# Patient Record
Sex: MALE | Race: WHITE | Employment: OTHER | URBAN - METROPOLITAN AREA
[De-identification: names, ages, dates, MRNs, and addresses within clinical notes are randomized per-mention and may not be internally consistent; named-entity substitution may affect disease eponyms.]

---

## 2023-08-31 PROBLEM — F32.A DEPRESSION: Status: ACTIVE | Noted: 2023-08-31

## 2023-08-31 PROBLEM — G47.00 INSOMNIA: Status: ACTIVE | Noted: 2023-08-31

## 2023-08-31 PROBLEM — G20.A1 PARKINSON DISEASE (MULTI): Status: ACTIVE | Noted: 2023-08-31

## 2023-08-31 RX ORDER — CARBIDOPA AND LEVODOPA 50; 200 MG/1; MG/1
1 TABLET, EXTENDED RELEASE ORAL NIGHTLY
COMMUNITY
End: 2023-11-16

## 2023-08-31 RX ORDER — CARBIDOPA AND LEVODOPA 25; 100 MG/1; MG/1
1.5 TABLET ORAL 4 TIMES DAILY
COMMUNITY
End: 2024-01-08 | Stop reason: ALTCHOICE

## 2023-08-31 RX ORDER — IBUPROFEN 100 MG/5ML
SUSPENSION, ORAL (FINAL DOSE FORM) ORAL
COMMUNITY
End: 2023-11-14

## 2023-08-31 RX ORDER — LUTEIN 6 MG
TABLET ORAL
COMMUNITY
Start: 2019-10-29

## 2023-08-31 RX ORDER — LANOLIN ALCOHOL/MO/W.PET/CERES
CREAM (GRAM) TOPICAL
COMMUNITY

## 2023-08-31 RX ORDER — AMOXICILLIN 500 MG/1
500 CAPSULE ORAL 4 TIMES DAILY
COMMUNITY
End: 2023-11-14

## 2023-08-31 RX ORDER — SELEGILINE HYDROCHLORIDE 5 MG/1
5 CAPSULE ORAL DAILY
COMMUNITY
Start: 2022-03-28

## 2023-08-31 RX ORDER — CARBIDOPA AND LEVODOPA 25; 100 MG/1; MG/1
1.5 TABLET ORAL 4 TIMES DAILY
COMMUNITY
Start: 2022-05-03 | End: 2024-01-08 | Stop reason: ALTCHOICE

## 2023-08-31 RX ORDER — PRAMIPEXOLE DIHYDROCHLORIDE 0.5 MG/1
1 TABLET ORAL 3 TIMES DAILY
COMMUNITY
Start: 2021-10-06 | End: 2023-10-20 | Stop reason: SDUPTHER

## 2023-10-09 ENCOUNTER — APPOINTMENT (OUTPATIENT)
Dept: NEUROLOGY | Facility: CLINIC | Age: 68
End: 2023-10-09
Payer: MEDICARE

## 2023-10-20 ENCOUNTER — TELEPHONE (OUTPATIENT)
Dept: NEUROLOGY | Facility: HOSPITAL | Age: 68
End: 2023-10-20
Payer: MEDICARE

## 2023-10-20 DIAGNOSIS — G20.A1 PARKINSON'S DISEASE, UNSPECIFIED WHETHER DYSKINESIA PRESENT, UNSPECIFIED WHETHER MANIFESTATIONS FLUCTUATE (MULTI): Primary | ICD-10-CM

## 2023-10-20 RX ORDER — PRAMIPEXOLE DIHYDROCHLORIDE 0.5 MG/1
0.5 TABLET ORAL 3 TIMES DAILY
Qty: 270 TABLET | Refills: 2 | OUTPATIENT
Start: 2023-10-20

## 2023-10-20 RX ORDER — PRAMIPEXOLE DIHYDROCHLORIDE 0.5 MG/1
0.5 TABLET ORAL 3 TIMES DAILY
Qty: 270 TABLET | Refills: 1 | Status: SHIPPED | OUTPATIENT
Start: 2023-10-20 | End: 2024-01-29

## 2023-10-20 NOTE — TELEPHONE ENCOUNTER
Ray Beckford Jr. called.  He is out of Pramipexole 5mg 1 TID. Send refills to [facility], [address].

## 2023-10-23 ENCOUNTER — TELEPHONE (OUTPATIENT)
Dept: NEUROLOGY | Facility: CLINIC | Age: 68
End: 2023-10-23
Payer: MEDICARE

## 2023-10-23 NOTE — TELEPHONE ENCOUNTER
Patient called requesting a refill of Pramipexole  0.5 mg sent to [facility] on [facility]. Thank you

## 2023-11-13 NOTE — PROGRESS NOTES
"Subjective     Ray Beckford Jr. is a 68 y.o. year old male who presents with Parkinson's Disease, here for follow up visit.    HPI    Since last visit May 2023:    Sinemet increased, selegeline decreased and doing better overall but wearing off is an issue as noted below. He is taking Sinemet sooner and sometimes he needs to take a 5th dose.       MOTOR SYMPTOMS      +/-                            Comments  Motor sx overall  Wearing off is worse and sx more severe. Bradykinesia is severe, stiffness and gait are also much worse  L toes are cramped   Tremor - But body can feel shaky, pointer finger may shake   Rigidity +    Bradykinesia +    Gait difficulty + Worse first thing in the am, shuffling, worse in LLE  Balance issues   Falls -    PT -    Exercise + Rock steady boxing 2x/week     NON MOTOR SYMPTOMS       +/-                               Comments  Orthostatic lightheadedness  -    Cognitive     Insomnia + Chronic x 10 years, sleeps 2hrs and then wakes  on and off.   CBD he used to use that helped but the past week stopped using and has been using the vibrating glove a friend who is an  made him (1-2hrs BID) sleeping 3-4hrs instead of 2 and sleeping 6.5hrs total which is good for him   RBD -    Depression + Sometimes depressed/down, low patience. He would like to see psychology (not psychiatry)    Anxiety + Sometimes   Sialorrhea + Not consistent enough to consider injections   Hypophonia +    Dysphagia + Some, he relates to mucous build up and \"sticks down the throat\", he states he clears his throat and is OK. May cough/choke if he eats crackers or cereal--he will be seeing [facility] (has to call)    Constipation + A little, BM daily to every other day        Social  Lives with: wife  Help with ADLs: independent              [facility] Meds  Med Dose Time   Carbidopa/levodopa 25/100mg 1.5tabs 4 times a day--sometimes 5x/day bc taking closer together Every 3-3.5hrs, last dose at 8pm "   Carbidopa/levodopa CR 50/200mg  1 tab at bedtime 11pm   Selegilene 5mg  1 tab daily in the am    Pramipexole: 0.5mg  1 tab 3 times a day (8 am - 2 pm and 8pm)   Latency     Wearing off After 3hrs and sx are severe    Side effects     Dyskinesia None    Hallucinations Shadow he thinks is there but is not    Impulse control Denies excessive spending which we discussed but states he likes a bargain and has been on [employer]    Sudden sleep Only in the am if he is not well rested he feels sleepy and naps  No falling asleep suddenly unless reading in the am    Other None            Current Outpatient Medications:   •  carbidopa-levodopa (Sinemet CR)  mg ER tablet, Take 1 tablet by mouth once daily at bedtime. Do not crush or chew., Disp: , Rfl:   •  carbidopa-levodopa (Sinemet)  mg tablet, Take 1.5 tablets by mouth 4 times a day., Disp: , Rfl:   •  carbidopa-levodopa (Sinemet)  mg tablet, Take 1.5 tablets by mouth 4 times a day., Disp: , Rfl:   •  cyanocobalamin (Vitamin B-12) 1,000 mcg tablet, B-12 TABS  Refills: 0     Active, Disp: , Rfl:   •  entacapone (Comtan) 200 mg tablet, Take 1 tablet (200 mg) by mouth 4 times a day., Disp: 120 tablet, Rfl: 3  •  ferrous sulfate (IRON ORAL), Fe Tabs TBEC  Refills: 0     Active, Disp: , Rfl:   •  fish oil concentrate (Omega-3) 120-180 mg capsule, Fish Oil 1000 MG Oral Capsule  Refills: 0      Start : 29-Oct-2019  Active, Disp: , Rfl:   •  pramipexole (Mirapex) 0.5 mg tablet, Take 1 tablet (0.5 mg) by mouth 3 times a day., Disp: 270 tablet, Rfl: 1  •  selegiline (Eldepryl) 5 mg capsule, Take 1 capsule (5 mg) by mouth early in the morning.., Disp: , Rfl:   •  vitamin E acid succinate (vitamin E succinate) 134 mg (200 unit) tablet, Vitamin E 200 UNIT Oral Tablet  Refills: 0      Start : 29-Oct-2019  Active, Disp: , Rfl:        Objective   Visit Vitals  Smoking Status Former      Physical Exam    MDS UPDRS 1st Score: Motor Examination  Is the patient  on medication for treating the symptoms of Parkinson's Disease?: Yes  Patients receiving medication for treating the symptoms of Parkinson's Disease, aliyah the patient's clinical state.: On  Is the patient on Levodopa?: Yes  Minutes since last Levodopa dose: 2hrs 40mins  Speech: 1  Facial Expression: 1  Rigidty Neck: 2  Rigidty RUE: 1  Rigidity - LUE: 2  Rigidity RLE: 2  Rigidity LLE: 2  Finger Tapping Right Hand: 1  Finger Tapping Left Hand: 1  Hand Movements- Right Hand: 0  Hand Movements- Left Hand: 1  Pronatiaon-Supination Movments - Right Hand: 1  Pronatiaon-Supination Movments Left Hand: 2  Toe Tapping Right Foot: 1  Toe Tapping - Left Foot: 1  Leg Agility - Right Le  Leg Agility - Left le  Arising from Chair: 0  Gait: 2  Freezing of Gait: 0  Postural Stability: 0  Posture: 1  Global Spontanteity of Movment ( Body Bradykinesia): 1  Postural Tremor - Right Hand: 0  Postural Tremor - Left hand: 0  Kinetic Tremor - Right hand: 1  Kinetic Tremor - Left hand: 1  Rest Tremor Amplitude - RUE: 0  Rest Tremor Amplitude - LUE: 0  Rest Tremor Amplitude - RLE: 0  Rest Tremor Amplitude - LLE: 0  Rest Tremor Amplitude - Lip/Jaw: 0  Constancy of Rest Tremor: 0  MDS UPDRS Total Score: 25  Were dyskinesias (chorea or dystonia) present during examination?: No        Assessment/Plan   Mr. Ray Beckford Jr. is a 68 y.o. M  with Parkinson's disease (idiopathic) He arrives for follow up. Has had significant improvement in symptoms since starting levodopa but recently has been having consistent wearing off q3hrs with severe ridigity, slowness and gait issues when off, he has decreased time between doses and so sometimes takes a 5th dose of Sinemet. Tolerating regimen well except some mild visual illusions, am sleepiness and maybe some spending issues--discussed in detail risks of ICD and sudden sleep with pramipexole and he is OK with lowering if he does better with the addition of entacapone which we will add for wearing off.  Consider increasing Sinemet vs trying Rytary next.  No Inbrija--dx with COPD, used to be on inhalers but no longer.     Diagnoses and all orders for this visit:  Depression, unspecified depression type  -     Referral to Psychology; Future  Parkinson's disease without dyskinesia, with fluctuating manifestations  -     Referral to Psychology; Future  -     entacapone (Comtan) 200 mg tablet; Take 1 tablet (200 mg) by mouth 4 times a day.      #Try Entacapone as a booster to your current regimen to help with wearing off.     Start Entacapone 200mg 1 tab with each dose of carbidopa-levodopa 4 times a day    Side effects include but are not limited to hallucinations, dyskinesia, nausea, dizziness/low BP, GI issues (diarrhea), If these or any other occur please let me know    #We could also try increasing Sinemet or trying Rytary    #Continue medications unchanged    Med Dose Time   Carbidopa/levodopa 25/100mg 1.5tabs 4 times a day--sometimes 5x/day bc taking closer together Every 3-3.5hrs, last dose at 8pm   Carbidopa/levodopa CR 50/200mg  1 tab at bedtime 11pm   Selegilene 5mg  1 tab daily in the am    Pramipexole: 0.5mg  1 tab 3 times a day (8 am - 2 pm and 8pm)     #If any side effects or worsening sedation with changes we can lower pramipexole    Let me know if sleepiness or sudden sleep worsens    #Speech therapy as planned for voice and swallowing    #Consult psychology for depression    Please call [phone] ([phone]) to schedule an apt.     #Follow as scheduled with Dr. Britany Sequeira, NP-C  Adult/Gerontological Nurse Practitioner   [facility], Department of Neurology  [facility] [place]  [facility]  [address] [place] [address]  Phone: [phone]  Fax: [phone]

## 2023-11-14 ENCOUNTER — APPOINTMENT (OUTPATIENT)
Dept: NEUROLOGY | Facility: HOSPITAL | Age: 68
End: 2023-11-14
Payer: MEDICARE

## 2023-11-14 ENCOUNTER — APPOINTMENT (OUTPATIENT)
Dept: NEUROLOGY | Facility: CLINIC | Age: 68
End: 2023-11-14
Payer: MEDICARE

## 2023-11-14 ENCOUNTER — OFFICE VISIT (OUTPATIENT)
Dept: NEUROLOGY | Facility: HOSPITAL | Age: 68
End: 2023-11-14
Payer: MEDICARE

## 2023-11-14 DIAGNOSIS — G20.A2 PARKINSON'S DISEASE WITHOUT DYSKINESIA, WITH FLUCTUATING MANIFESTATIONS (MULTI): ICD-10-CM

## 2023-11-14 DIAGNOSIS — F32.A DEPRESSION, UNSPECIFIED DEPRESSION TYPE: Primary | ICD-10-CM

## 2023-11-14 PROCEDURE — 1036F TOBACCO NON-USER: CPT | Performed by: NURSE PRACTITIONER

## 2023-11-14 PROCEDURE — 1160F RVW MEDS BY RX/DR IN RCRD: CPT | Performed by: NURSE PRACTITIONER

## 2023-11-14 PROCEDURE — 1159F MED LIST DOCD IN RCRD: CPT | Performed by: NURSE PRACTITIONER

## 2023-11-14 PROCEDURE — 99214 OFFICE O/P EST MOD 30 MIN: CPT | Performed by: NURSE PRACTITIONER

## 2023-11-14 RX ORDER — ENTACAPONE 200 MG/1
200 TABLET ORAL 4 TIMES DAILY
Qty: 120 TABLET | Refills: 3 | Status: SHIPPED | OUTPATIENT
Start: 2023-11-14 | End: 2024-02-07 | Stop reason: SDUPTHER

## 2023-11-14 ASSESSMENT — UNIFIED PARKINSONS DISEASE RATING SCALE (UPDRS)
FACIAL_EXPRESSION: 1
PRONATION_SUPINATION_RIGHT: 1
AMPLITUDE_LIP_JAW: 0
FINGER_TAPPING_RIGHT: 1
LEG_AGILITY_LEFT: 0
TOETAPPING_LEFT: 1
AMPLITUDE_RUE: 0
RIGIDITY_LLE: 2
RIGIDITY_LUE: 2
AMPLITUDE_LUE: 0
KINETIC_TREMOR_RIGHTHAND: 1
RIGIDITY_RUE: 1
LEVODOPA: YES
PARKINSONS_MEDS: YES
POSTURAL_TREMOR_RIGHTHAND: 0
POSTURAL_TREMOR_LEFTHAND: 0
RIGIDITY_RLE: 2
CHAIR_RISING_SCALE: 0
PRONATION_SUPINATION_LEFT: 2
FINGER_TAPPING_LEFT: 1
KINETIC_TREMOR_LEFTHAND: 1
POSTURE: 1
TOTAL_SCORE: 25
GAIT: 2
RIGIDITY_NECK: 2
SPONTANEITY_OF_MOVEMENT: 1
HANDMOVEMENTS_RIGHT: 0
TOETAPPING_RIGHT: 1
POSTURAL_STABILITY: 0
LEG_AGILITY_RIGHT: 0
SPEECH: 1
FREEZING_GAIT: 0
DYSKINESIAS_PRESENT: NO
AMPLITUDE_RLE: 0
CONSTANCY_TREMOR_ATREST: 0
CLINICAL_STATE: ON
AMPLITUDE_LLE: 0

## 2023-11-14 NOTE — PATIENT INSTRUCTIONS
#Try Entacapone as a booster to your current regimen to help with wearing off.     Start Entacapone 200mg 1 tab with each dose of carbidopa-levodopa 4 times a day    Side effects include but are not limited to hallucinations, dyskinesia, nausea, dizziness/low BP, GI issues (diarrhea), If these or any other occur please let me know and OK to stop if needed.     #We could also try increasing Sinemet or trying Rytary    #Continue medications unchanged    Med Dose Time   Carbidopa/levodopa 25/100mg 1.5tabs 4 times a day--sometimes 5x/day bc taking closer together Every 3-3.5hrs, last dose at 8pm   Carbidopa/levodopa CR 50/200mg  1 tab at bedtime 11pm   Selegilene 5mg  1 tab daily in the am    Pramipexole: 0.5mg  1 tab 3 times a day (8 am - 2 pm and 8pm)     #If any side effects or worsening sedation with changes we can lower pramipexole    Let me know if sleepiness or sudden sleep worsens    #Speech therapy as planned for voice and swallowing    #Consult psychology for depression    Please call [phone] ([phone]) to schedule an apt.       #Follow as scheduled with Dr. Garg

## 2023-11-16 DIAGNOSIS — G20.A1 PARKINSON'S DISEASE (MULTI): ICD-10-CM

## 2023-11-16 RX ORDER — CARBIDOPA AND LEVODOPA 50; 200 MG/1; MG/1
1 TABLET, EXTENDED RELEASE ORAL NIGHTLY
Qty: 90 TABLET | Refills: 2 | Status: SHIPPED | OUTPATIENT
Start: 2023-11-16 | End: 2024-02-07 | Stop reason: SDUPTHER

## 2024-01-06 DIAGNOSIS — G20.A1 PARKINSON'S DISEASE (MULTI): ICD-10-CM

## 2024-01-08 RX ORDER — CARBIDOPA AND LEVODOPA 25; 100 MG/1; MG/1
TABLET ORAL
Qty: 540 TABLET | Refills: 2 | Status: SHIPPED | OUTPATIENT
Start: 2024-01-08 | End: 2024-04-02 | Stop reason: SDUPTHER

## 2024-01-08 RX ORDER — CARBIDOPA AND LEVODOPA 50; 200 MG/1; MG/1
1 TABLET, EXTENDED RELEASE ORAL NIGHTLY
Qty: 90 TABLET | Refills: 2 | OUTPATIENT
Start: 2024-01-08

## 2024-01-27 DIAGNOSIS — G20.A1 PARKINSON'S DISEASE, UNSPECIFIED WHETHER DYSKINESIA PRESENT, UNSPECIFIED WHETHER MANIFESTATIONS FLUCTUATE (MULTI): ICD-10-CM

## 2024-01-29 RX ORDER — PRAMIPEXOLE DIHYDROCHLORIDE 0.5 MG/1
0.5 TABLET ORAL 3 TIMES DAILY
Qty: 270 TABLET | Refills: 1 | Status: SHIPPED | OUTPATIENT
Start: 2024-01-29 | End: 2024-02-07 | Stop reason: SDUPTHER

## 2024-02-07 ENCOUNTER — OFFICE VISIT (OUTPATIENT)
Dept: NEUROLOGY | Facility: CLINIC | Age: 69
End: 2024-02-07
Payer: MEDICARE

## 2024-02-07 VITALS
WEIGHT: 213 LBS | DIASTOLIC BLOOD PRESSURE: 83 MMHG | HEIGHT: 71 IN | SYSTOLIC BLOOD PRESSURE: 135 MMHG | BODY MASS INDEX: 29.82 KG/M2

## 2024-02-07 DIAGNOSIS — G20.A1 PARKINSON'S DISEASE, UNSPECIFIED WHETHER DYSKINESIA PRESENT, UNSPECIFIED WHETHER MANIFESTATIONS FLUCTUATE (MULTI): ICD-10-CM

## 2024-02-07 DIAGNOSIS — G20.A1 PARKINSON'S DISEASE (MULTI): ICD-10-CM

## 2024-02-07 DIAGNOSIS — G20.A2 PARKINSON'S DISEASE WITHOUT DYSKINESIA, WITH FLUCTUATING MANIFESTATIONS (MULTI): ICD-10-CM

## 2024-02-07 PROCEDURE — 99214 OFFICE O/P EST MOD 30 MIN: CPT | Performed by: PSYCHIATRY & NEUROLOGY

## 2024-02-07 PROCEDURE — 1159F MED LIST DOCD IN RCRD: CPT | Performed by: PSYCHIATRY & NEUROLOGY

## 2024-02-07 PROCEDURE — 1125F AMNT PAIN NOTED PAIN PRSNT: CPT | Performed by: PSYCHIATRY & NEUROLOGY

## 2024-02-07 PROCEDURE — 1036F TOBACCO NON-USER: CPT | Performed by: PSYCHIATRY & NEUROLOGY

## 2024-02-07 PROCEDURE — 1160F RVW MEDS BY RX/DR IN RCRD: CPT | Performed by: PSYCHIATRY & NEUROLOGY

## 2024-02-07 RX ORDER — CARBIDOPA AND LEVODOPA 50; 200 MG/1; MG/1
1 TABLET, EXTENDED RELEASE ORAL 2 TIMES DAILY
Qty: 180 TABLET | Refills: 3 | Status: SHIPPED | OUTPATIENT
Start: 2024-02-07 | End: 2025-02-06

## 2024-02-07 RX ORDER — OMEPRAZOLE 40 MG/1
1 CAPSULE, DELAYED RELEASE ORAL
COMMUNITY
Start: 2015-02-16

## 2024-02-07 RX ORDER — PRAMIPEXOLE DIHYDROCHLORIDE 0.5 MG/1
0.5 TABLET ORAL 3 TIMES DAILY
Qty: 270 TABLET | Refills: 3 | Status: SHIPPED | OUTPATIENT
Start: 2024-02-07 | End: 2025-02-06

## 2024-02-07 RX ORDER — ENTACAPONE 200 MG/1
200 TABLET ORAL 4 TIMES DAILY
Qty: 360 TABLET | Refills: 3 | Status: SHIPPED | OUTPATIENT
Start: 2024-02-07 | End: 2025-02-06

## 2024-02-07 ASSESSMENT — ANXIETY QUESTIONNAIRES
7. FEELING AFRAID AS IF SOMETHING AWFUL MIGHT HAPPEN: NOT AT ALL
GAD7 TOTAL SCORE: 0
3. WORRYING TOO MUCH ABOUT DIFFERENT THINGS: NOT AT ALL
1. FEELING NERVOUS, ANXIOUS, OR ON EDGE: NOT AT ALL
5. BEING SO RESTLESS THAT IT IS HARD TO SIT STILL: NOT AT ALL
2. NOT BEING ABLE TO STOP OR CONTROL WORRYING: NOT AT ALL
IF YOU CHECKED OFF ANY PROBLEMS ON THIS QUESTIONNAIRE, HOW DIFFICULT HAVE THESE PROBLEMS MADE IT FOR YOU TO DO YOUR WORK, TAKE CARE OF THINGS AT HOME, OR GET ALONG WITH OTHER PEOPLE: NOT DIFFICULT AT ALL
6. BECOMING EASILY ANNOYED OR IRRITABLE: NOT AT ALL
4. TROUBLE RELAXING: NOT AT ALL

## 2024-02-07 ASSESSMENT — UNIFIED PARKINSONS DISEASE RATING SCALE (UPDRS)
FINGER_TAPPING_RIGHT: 2
LEVODOPA: YES
POSTURE: 0
RIGIDITY_RLE: 2
TOETAPPING_LEFT: 0
RIGIDITY_LLE: 1
KINETIC_TREMOR_LEFTHAND: 0
FINGER_TAPPING_LEFT: 1
CHAIR_RISING_SCALE: 0
SPEECH: 2
AMPLITUDE_LUE: 0
PRONATION_SUPINATION_LEFT: 0
RIGIDITY_RUE: 2
RIGIDITY_NECK: 1
KINETIC_TREMOR_RIGHTHAND: 0
SPONTANEITY_OF_MOVEMENT: 1
POSTURAL_TREMOR_LEFTHAND: 0
POSTURAL_STABILITY: 0
PARKINSONS_MEDS: YES
TOTAL_SCORE: 15
TOETAPPING_RIGHT: 0
AMPLITUDE_RLE: 0
AMPLITUDE_RUE: 0
FACIAL_EXPRESSION: 1
RIGIDITY_LUE: 1
HANDMOVEMENTS_RIGHT: 0
CONSTANCY_TREMOR_ATREST: 0
GAIT: 1
CLINICAL_STATE: ON
FREEZING_GAIT: 0
DYSKINESIAS_PRESENT: NO
LEG_AGILITY_RIGHT: 0
LEG_AGILITY_LEFT: 0
PRONATION_SUPINATION_RIGHT: 0
AMPLITUDE_LLE: 0
AMPLITUDE_LIP_JAW: 0
POSTURAL_TREMOR_RIGHTHAND: 0

## 2024-02-07 ASSESSMENT — PATIENT HEALTH QUESTIONNAIRE - PHQ9
1. LITTLE INTEREST OR PLEASURE IN DOING THINGS: NOT AT ALL
SUM OF ALL RESPONSES TO PHQ9 QUESTIONS 1 AND 2: 0
2. FEELING DOWN, DEPRESSED OR HOPELESS: NOT AT ALL

## 2024-02-07 ASSESSMENT — ENCOUNTER SYMPTOMS
LOSS OF SENSATION IN FEET: 0
OCCASIONAL FEELINGS OF UNSTEADINESS: 0
DEPRESSION: 0

## 2024-02-07 NOTE — PROGRESS NOTES
Subjective     Ray Beckford Jr. is a right handed  68 y.o. year old male who presents with No chief complaint on file.. Patient is accompanied by: spouse  Visit type: follow up visit       He feel she is doing well.   He hurt his foot doing [employer] boxing (plantar fasciitis)  He has developed a rather complex medication regimen, see below, but feels it works really well. Appears somewhat obsessive about his Parkinson's disease.         Med Dose Time   Carbidopa/levodopa 25/100mg 2 tabs at 7 am - 1 tab at 10 am - 2 tabs at 2 pm = 2 tabs at 6 pm, 1 tab at 9 pm, 1/2 tab at 11 pm.     Carbidopa/levodopa CR 50/200mg  1 tab at 10 am and 1 tab at bedtime 11pm   Selegilene 5mg  1 tab daily in the am     Pramipexole: 0.5mg  1 tab 3 times a day (7 am - 2 pm and 6pm)   Entacapone 200 mhg  4 times a day - 7 -10-1 and 6 pm      Meds are currently lasting from one dose to the next.   No dyskinesias.   Kick in: 30 mins -1 hour.     Review of Motor symptoms:  Tremor:  Location- denies, feels a little shake in hands when meds wear off                 Rest/postural/action- denies  Stiffness/rigidity: yes, stiff in thighs, harder to bed down.   Slowness:  mild  Trouble walking:  good apart from has plantar fascitis currently, wife says shuffling in evening  Freezing of gait:  denies  Balance problems:  pretty good  Falls:  denies  Changes in speech:  usually good, slurs when meds wear off  Swallowing difficulties:  denies, does have a lot of phlegm,   Activities of daily living (buttoning clothes, bathing, cutting food, etc):  independent    Review of Non-Motor symptoms:  Cognition:  Memory- memory is fine         Hallucinations-  denies           Mood:        Depression-  good                       Anxiety: good                       Obsessive/compulsive behaviors- some obsession w PD. Bought a lot of TVs and fixes them.   Sleep disturbances including REM behavior disorder cannot stay asleep, wakes up after 2-3 hours. He takes CBD  w melatonin - has been melatonin.   Sensory changes (ie, smell or taste):  Hyposmia  Gastrointestinal complaints/Constipation:  not really  Urinary retention or frequency:  sometimes low flow.   Positional lightheadedness and/or syncope:  denies  Excessive saliva/drooling:  denies. But has phlegm.   Fatigue:  denies      Patient Active Problem List   Diagnosis    Depression    Insomnia    Parkinson disease      Past Medical History:   Diagnosis Date    Personal history of other diseases of the digestive system     History of hiatal hernia    Personal history of other diseases of the digestive system     History of gastroesophageal reflux (GERD)      Past Surgical History:   Procedure Laterality Date    OTHER SURGICAL HISTORY  10/29/2019    Ankle surgery      Social History     Socioeconomic History    Marital status:      Spouse name: Not on file    Number of children: Not on file    Years of education: Not on file    Highest education level: Not on file   Occupational History    Not on file   Tobacco Use    Smoking status: Former     Types: Cigarettes    Smokeless tobacco: Never   Substance and Sexual Activity    Alcohol use: Never    Drug use: Never    Sexual activity: Not on file   Other Topics Concern    Not on file   Social History Narrative    Not on file     Social Determinants of Health     Financial Resource Strain: Not on file   Food Insecurity: Not on file   Transportation Needs: Not on file   Physical Activity: Not on file   Stress: Not on file   Social Connections: Not on file   Intimate Partner Violence: Not on file   Housing Stability: Not on file      No family history on file.   Patient Health Questionnaire-2 Score: 0          Review of Systems  All other system have been reviewed and are negative for complaint.  Objective   Vitals:    02/07/24 1119   BP: 135/83        Neurological Exam      MDS UPDRS 1st Score: Motor Examination  Is the patient on medication for treating the symptoms of  Parkinson's Disease?: Yes  Patients receiving medication for treating the symptoms of Parkinson's Disease, aliyah the patient's clinical state.: On  Is the patient on Levodopa?: Yes  Minutes since last Levodopa dose: 100  mins  Speech: 2  Facial Expression: 1  Rigidty Neck: 1  Rigidty RUE: 2  Rigidity - LUE: 1  Rigidity RLE: 2  Rigidity LLE: 1  Finger Tapping Right Hand: 2  Finger Tapping Left Hand: 1  Hand Movements- Right Hand: 0  Hand Movements- Left Hand: 0  Pronatiaon-Supination Movments - Right Hand: 0  Pronatiaon-Supination Movments Left Hand: 0  Toe Tapping Right Foot: 0  Toe Tapping - Left Foot: 0  Leg Agility - Right Le  Leg Agility - Left le  Arising from Chair: 0  Gait: 1 (antalgic)  Freezing of Gait: 0  Postural Stability: 0  Posture: 0  Global Spontanteity of Movment ( Body Bradykinesia): 1  Postural Tremor - Right Hand: 0  Postural Tremor - Left hand: 0  Kinetic Tremor - Right hand: 0  Kinetic Tremor - Left hand: 0  Rest Tremor Amplitude - RUE: 0  Rest Tremor Amplitude - LUE: 0  Rest Tremor Amplitude - RLE: 0  Rest Tremor Amplitude - LLE: 0  Rest Tremor Amplitude - Lip/Jaw: 0  Constancy of Rest Tremor: 0  MDS UPDRS Total Score: 15  Were dyskinesias (chorea or dystonia) present during examination?: No                           Assessment/Plan       Ray Beckford Jr. is a 68 y.o. year old male here for Mr. Ray Beckford Jr. is a 68 y.o. M  with Parkinson's disease (idiopathic) He arrives for follow up. Has had significant improvement in symptoms since starting levodopa and really likes the current medication schedule, although quite complex. Would change to Rytary next. No Inbrija--dx with COPD, used to be on inhalers but no longer.       Plan:  Continue current medications as listed above  Advised re ICD again, does have a somewhat hyperfocused view of PD, but has always been his personality, has been buying and fixing TVs,  but no overspending  Continue to exercise  Consider Rytary next  RTC in   months,

## 2024-02-07 NOTE — PATIENT INSTRUCTIONS
Lets continue to the medication as is.   Consider Rytary.   Don't spend the whole day thinking about PD.   Continue to exercise, this is great for you!  RTC in 6 months.

## 2024-02-07 NOTE — LETTER
February 8, 2024     Alfonso Choe MD  [address] [facility] [address]  As Of 11/14/2020  [facility] [address]    Patient: Ray Beckford Jr.   YOB: 1955   Date of Visit: 2/7/2024       Dear Dr. Alfonso Choe MD:    Thank you for referring Ray Beckford to me for evaluation. Below are my notes for this consultation.  If you have questions, please do not hesitate to call me. I look forward to following your patient along with you.       Sincerely,     Marjorie Garg MD      CC: No Recipients  ______________________________________________________________________________________    Subjective    Ray Beckford Jr. is a right handed  68 y.o. year old male who presents with No chief complaint on file.. Patient is accompanied by: spouse  Visit type: follow up visit       He feel she is doing well.   He hurt his foot doing [employer] boxing (plantar fasciitis)  He has developed a rather complex medication regimen, see below, but feels it works really well. Appears somewhat obsessive about his Parkinson's disease.         Med Dose Time   Carbidopa/levodopa 25/100mg 2 tabs at 7 am - 1 tab at 10 am - 2 tabs at 2 pm = 2 tabs at 6 pm, 1 tab at 9 pm, 1/2 tab at 11 pm.     Carbidopa/levodopa CR 50/200mg  1 tab at 10 am and 1 tab at bedtime 11pm   Selegilene 5mg  1 tab daily in the am     Pramipexole: 0.5mg  1 tab 3 times a day (7 am - 2 pm and 6pm)   Entacapone 200 mhg  4 times a day - 7 -10-1 and 6 pm      Meds are currently lasting from one dose to the next.   No dyskinesias.   Kick in: 30 mins -1 hour.     Review of Motor symptoms:  Tremor:  Location- denies, feels a little shake in hands when meds wear off                 Rest/postural/action- denies  Stiffness/rigidity: yes, stiff in thighs, harder to bed down.   Slowness:  mild  Trouble walking:  good apart from has plantar fascitis currently, wife says shuffling in evening  Freezing of gait:  denies  Balance problems:  pretty good  Falls:  denies  Changes in  speech:  usually good, slurs when meds wear off  Swallowing difficulties:  denies, does have a lot of phlegm,   Activities of daily living (buttoning clothes, bathing, cutting food, etc):  independent    Review of Non-Motor symptoms:  Cognition:  Memory- memory is fine         Hallucinations-  denies           Mood:        Depression-  good                       Anxiety: good                       Obsessive/compulsive behaviors- some obsession w PD. Bought a lot of TVs and fixes them.   Sleep disturbances including REM behavior disorder cannot stay asleep, wakes up after 2-3 hours. He takes CBD w melatonin - has been melatonin.   Sensory changes (ie, smell or taste):  Hyposmia  Gastrointestinal complaints/Constipation:  not really  Urinary retention or frequency:  sometimes low flow.   Positional lightheadedness and/or syncope:  denies  Excessive saliva/drooling:  denies. But has phlegm.   Fatigue:  denies      Patient Active Problem List   Diagnosis   • Depression   • Insomnia   • Parkinson disease      Past Medical History:   Diagnosis Date   • Personal history of other diseases of the digestive system     History of hiatal hernia   • Personal history of other diseases of the digestive system     History of gastroesophageal reflux (GERD)      Past Surgical History:   Procedure Laterality Date   • OTHER SURGICAL HISTORY  10/29/2019    Ankle surgery      Social History     Socioeconomic History   • Marital status:      Spouse name: Not on file   • Number of children: Not on file   • Years of education: Not on file   • Highest education level: Not on file   Occupational History   • Not on file   Tobacco Use   • Smoking status: Former     Types: Cigarettes   • Smokeless tobacco: Never   Substance and Sexual Activity   • Alcohol use: Never   • Drug use: Never   • Sexual activity: Not on file   Other Topics Concern   • Not on file   Social History Narrative   • Not on file     Social Determinants of Health      Financial Resource Strain: Not on file   Food Insecurity: Not on file   Transportation Needs: Not on file   Physical Activity: Not on file   Stress: Not on file   Social Connections: Not on file   Intimate Partner Violence: Not on file   Housing Stability: Not on file      No family history on file.   Patient Health Questionnaire-2 Score: 0          Review of Systems  All other system have been reviewed and are negative for complaint.  Objective  Vitals:    24 1119   BP: 135/83        Neurological Exam      MDS UPDRS 1st Score: Motor Examination  Is the patient on medication for treating the symptoms of Parkinson's Disease?: Yes  Patients receiving medication for treating the symptoms of Parkinson's Disease, aliyah the patient's clinical state.: On  Is the patient on Levodopa?: Yes  Minutes since last Levodopa dose: 100  mins  Speech: 2  Facial Expression: 1  Rigidty Neck: 1  Rigidty RUE: 2  Rigidity - LUE: 1  Rigidity RLE: 2  Rigidity LLE: 1  Finger Tapping Right Hand: 2  Finger Tapping Left Hand: 1  Hand Movements- Right Hand: 0  Hand Movements- Left Hand: 0  Pronatiaon-Supination Movments - Right Hand: 0  Pronatiaon-Supination Movments Left Hand: 0  Toe Tapping Right Foot: 0  Toe Tapping - Left Foot: 0  Leg Agility - Right Le  Leg Agility - Left le  Arising from Chair: 0  Gait: 1 (antalgic)  Freezing of Gait: 0  Postural Stability: 0  Posture: 0  Global Spontanteity of Movment ( Body Bradykinesia): 1  Postural Tremor - Right Hand: 0  Postural Tremor - Left hand: 0  Kinetic Tremor - Right hand: 0  Kinetic Tremor - Left hand: 0  Rest Tremor Amplitude - RUE: 0  Rest Tremor Amplitude - LUE: 0  Rest Tremor Amplitude - RLE: 0  Rest Tremor Amplitude - LLE: 0  Rest Tremor Amplitude - Lip/Jaw: 0  Constancy of Rest Tremor: 0  MDS UPDRS Total Score: 15  Were dyskinesias (chorea or dystonia) present during examination?: No                           Assessment/Plan      Ray Beckford Jr. is a 68 y.o. year old  male here for Mr. Ray Beckford . is a 68 y.o. M  with Parkinson's disease (idiopathic) He arrives for follow up. Has had significant improvement in symptoms since starting levodopa and really likes the current medication schedule, although quite complex. Would change to Rytary next. No Inbrija--dx with COPD, used to be on inhalers but no longer.       Plan:  Continue current medications as listed above  Advised re ICD again, does have a somewhat hyperfocused view of PD, but has always been his personality, has been buying and fixing TVs,  but no overspending  Continue to exercise  Consider Rytary next  RTC in 6 months,

## 2024-03-20 ENCOUNTER — TELEPHONE (OUTPATIENT)
Dept: NEUROLOGY | Facility: HOSPITAL | Age: 69
End: 2024-03-20
Payer: MEDICARE

## 2024-03-20 NOTE — TELEPHONE ENCOUNTER
Ray Beckford Jr. called.  He is running out of Carbidopa/ Levodopa 25/100. He is taking 2 QID. This is not the dose that Dr Garg suggested at the last visit. He has self adjusted his dose and therefore running out of pills early.

## 2024-03-21 ENCOUNTER — TELEPHONE (OUTPATIENT)
Dept: NEUROLOGY | Facility: CLINIC | Age: 69
End: 2024-03-21
Payer: MEDICARE

## 2024-03-21 NOTE — TELEPHONE ENCOUNTER
Called Back- No Answer, Left detailed message outlining the 4 medications on file for him. Confirmed 3 Rx were at [facility] and one at [facility]. All were refilled in Jan & Feb with 9 months of refills. Urged him to contact pharmacies for meds.

## 2024-04-02 ENCOUNTER — TELEPHONE (OUTPATIENT)
Dept: NEUROLOGY | Facility: HOSPITAL | Age: 69
End: 2024-04-02
Payer: MEDICARE

## 2024-04-02 ENCOUNTER — TELEPHONE (OUTPATIENT)
Dept: NEUROLOGY | Facility: CLINIC | Age: 69
End: 2024-04-02
Payer: MEDICARE

## 2024-04-02 DIAGNOSIS — G20.A1 PARKINSON'S DISEASE, UNSPECIFIED WHETHER DYSKINESIA PRESENT, UNSPECIFIED WHETHER MANIFESTATIONS FLUCTUATE (MULTI): ICD-10-CM

## 2024-04-02 DIAGNOSIS — G20.A1 PARKINSON'S DISEASE (MULTI): ICD-10-CM

## 2024-04-02 RX ORDER — PRAMIPEXOLE DIHYDROCHLORIDE 0.5 MG/1
0.5 TABLET ORAL 3 TIMES DAILY
Qty: 270 TABLET | Refills: 3 | Status: CANCELLED | OUTPATIENT
Start: 2024-04-02 | End: 2025-04-02

## 2024-04-02 RX ORDER — CARBIDOPA AND LEVODOPA 25; 100 MG/1; MG/1
TABLET ORAL
Qty: 720 TABLET | Refills: 3 | Status: SHIPPED | OUTPATIENT
Start: 2024-04-02

## 2024-04-02 NOTE — TELEPHONE ENCOUNTER
Ray Beckford Jr. called. He would also like to request a new script for pramipexole 0.5mg 1 TID. #270 for a 90 day supply with 3 refills. Please send to bttn on file

## 2024-04-02 NOTE — TELEPHONE ENCOUNTER
Ray called. We got his Carbidopa/ Levodopa 25/100 script all straightened out. He is taking his 25/100 as Dr Garg noted in her last office note.  The script has to identify the exact dose.  2tabs at 7a, 1 tab at 10a, 2 tabs at 2p, 2 tabs at 6p, 1 tab at 9p.  ( 8 tabs daily, #720 with 2 refills) [facility] to [facility] [address] [place] (on file)  He takes his Carbidopa/ Levodopa ER 50/200 1 tab at 10am & 11pm ( no refills needed on this yet, just for documentation)

## 2024-04-02 NOTE — TELEPHONE ENCOUNTER
Pt sends request to change one prescription that were written last month to new pharmacy. I called and left VM to confirm his preferred pharmacy. Left me phone number to call back with info.

## 2024-08-06 ENCOUNTER — APPOINTMENT (OUTPATIENT)
Dept: NEUROLOGY | Facility: CLINIC | Age: 69
End: 2024-08-06
Payer: MEDICARE

## 2024-08-06 VITALS
WEIGHT: 213 LBS | HEART RATE: 63 BPM | DIASTOLIC BLOOD PRESSURE: 77 MMHG | SYSTOLIC BLOOD PRESSURE: 117 MMHG | HEIGHT: 71 IN | BODY MASS INDEX: 29.82 KG/M2

## 2024-08-06 DIAGNOSIS — G20.B2 PARKINSON'S DISEASE WITH DYSKINESIA AND FLUCTUATING MANIFESTATIONS (MULTI): Primary | ICD-10-CM

## 2024-08-06 DIAGNOSIS — G20.A2 PARKINSON'S DISEASE WITHOUT DYSKINESIA, WITH FLUCTUATING MANIFESTATIONS (MULTI): ICD-10-CM

## 2024-08-06 PROCEDURE — 1160F RVW MEDS BY RX/DR IN RCRD: CPT | Performed by: PSYCHIATRY & NEUROLOGY

## 2024-08-06 PROCEDURE — 3008F BODY MASS INDEX DOCD: CPT | Performed by: PSYCHIATRY & NEUROLOGY

## 2024-08-06 PROCEDURE — 99213 OFFICE O/P EST LOW 20 MIN: CPT | Performed by: PSYCHIATRY & NEUROLOGY

## 2024-08-06 PROCEDURE — 1125F AMNT PAIN NOTED PAIN PRSNT: CPT | Performed by: PSYCHIATRY & NEUROLOGY

## 2024-08-06 PROCEDURE — 1159F MED LIST DOCD IN RCRD: CPT | Performed by: PSYCHIATRY & NEUROLOGY

## 2024-08-06 RX ORDER — SELEGILINE HYDROCHLORIDE 5 MG/1
5 CAPSULE ORAL DAILY
Qty: 90 CAPSULE | Refills: 3 | Status: SHIPPED | OUTPATIENT
Start: 2024-08-06 | End: 2025-08-06

## 2024-08-06 RX ORDER — ENTACAPONE 200 MG/1
200 TABLET ORAL 4 TIMES DAILY
Qty: 360 TABLET | Refills: 3 | Status: SHIPPED | OUTPATIENT
Start: 2024-08-06 | End: 2025-08-06

## 2024-08-06 ASSESSMENT — UNIFIED PARKINSONS DISEASE RATING SCALE (UPDRS)
FACIAL_EXPRESSION: 0
KINETIC_TREMOR_RIGHTHAND: 0
CONSTANCY_TREMOR_ATREST: 0
AMPLITUDE_LLE: 0
GAIT: 0
DYSKINESIAS_PRESENT: YES
SPEECH: 0
AMPLITUDE_RLE: 0
MOVEMENTS_INTERFERE_WITH_RATINGS: NO
FINGER_TAPPING_LEFT: 1
POSTURAL_STABILITY: 1
RIGIDITY_LLE: 2
LEG_AGILITY_RIGHT: 0
PARKINSONS_MEDS: YES
CHAIR_RISING_SCALE: 0
POSTURE: 0
KINETIC_TREMOR_LEFTHAND: 0
FREEZING_GAIT: 0
RIGIDITY_RUE: 1
PRONATION_SUPINATION_LEFT: 0
AMPLITUDE_LIP_JAW: 0
CLINICAL_STATE: ON
TOETAPPING_RIGHT: 0
FINGER_TAPPING_RIGHT: 2
AMPLITUDE_RUE: 0
RIGIDITY_NECK: 1
HANDMOVEMENTS_RIGHT: 1
POSTURAL_TREMOR_RIGHTHAND: 0
SPONTANEITY_OF_MOVEMENT: 0
TOETAPPING_LEFT: 1
TOTAL_SCORE: 14
RIGIDITY_LUE: 1
RIGIDITY_RLE: 2
PRONATION_SUPINATION_RIGHT: 0
AMPLITUDE_LUE: 0
POSTURAL_TREMOR_LEFTHAND: 0
LEG_AGILITY_LEFT: 0
LEVODOPA: YES

## 2024-08-06 ASSESSMENT — PATIENT HEALTH QUESTIONNAIRE - PHQ9
SUM OF ALL RESPONSES TO PHQ9 QUESTIONS 1 & 2: 0
2. FEELING DOWN, DEPRESSED OR HOPELESS: NOT AT ALL
1. LITTLE INTEREST OR PLEASURE IN DOING THINGS: NOT AT ALL

## 2024-08-06 ASSESSMENT — ENCOUNTER SYMPTOMS
DEPRESSION: 0
LOSS OF SENSATION IN FEET: 0
OCCASIONAL FEELINGS OF UNSTEADINESS: 0

## 2024-08-06 ASSESSMENT — PAIN SCALES - GENERAL: PAINLEVEL: 2

## 2024-08-06 NOTE — PATIENT INSTRUCTIONS
It was nice to see you today.   Continue current medication regimen.   We can consider Inbrijia inhaled levodopa for morning difficulty in moving in the future, you feel like you are doing okay w current regimen.       Med Dose Time   Carbidopa/levodopa 25/100mg 2 tabs at 7 am - 2 tab at 10 am - 2 tabs at 2 pm - 2 tabs at 6 pm, 2 tab at 9 pm    Carbidopa/levodopa CR 50/200mg  1 tab at 10 am and 1 tab at bedtime 11pm   Selegilene 5mg  1 tab daily in the am     Pramipexole: 0.5mg  1 tab 3 times a day (7 am - 2 pm and 6pm)   Entacapone 200 mhg  4 times a day - 7 -10-1 and 6 pm        Continue to exercise    RTC in 6 months,

## 2024-08-06 NOTE — LETTER
August 7, 2024     Alfonso Choe MD  [address] [facility]  As Of 11/14/2020  [facility] [address]    Patient: Ray Beckford Jr.   YOB: 1955   Date of Visit: 8/6/2024       Dear Dr. Alfonso Choe MD:    Thank you for referring Ray Beckford to me for evaluation. Below are my notes for this consultation.  If you have questions, please do not hesitate to call me. I look forward to following your patient along with you.       Sincerely,     Marjorie Garg MD      CC: No Recipients  ______________________________________________________________________________________    Subjective    Ray Beckford Jr. is a right handed  69 y.o. year old male who presents with Med Refill and Parkinson's Disease (Patient reports blood work shows low iron.). Patient is accompanied by: spouse  Visit type: follow up visit       He feel he is doing well.  Has been doing [employer] boxing 2 times per week. He rides a bike every morning to get his heart rate up.     He has developed a rather complex medication regimen, see below, but feels it works really well. Appears somewhat obsessive about his Parkinson's disease.     He has been using vibrating gloves 1/2 hour per day - he finds it helps his sleep.     Med Dose Time   Carbidopa/levodopa 25/100mg 2 tabs at 7 am - 2 tab at 10 am - 2 tabs at 2 pm - 2 tabs at 6 pm, 2 tab at 9 pm    Carbidopa/levodopa CR 50/200mg  1 tab at 10 am and 1 tab at bedtime 11pm   Selegilene 5mg  1 tab daily in the am     Pramipexole: 0.5mg  1 tab 3 times a day (7 am - 2 pm and 6pm)   Entacapone 200 mhg  4 times a day - 7 -10-1 and 6 pm      Also takes several supplements - fish oil, Vitamin E, Olive leaf extract,   Also takes Nasacort as well as iron supplements.   Takes Melatonin 5 mg occasionally.     Meds are currently lasting from one dose to the next.   No dyskinesias.   Kick in: 30 mins -1 hour.   When he first wakes up in am - he feels that his legs are glued to the ground,   Once he  takes his meds he is doing okay.   Downtime: 60 mins in am as well as at 10 am for 30 mins.   Joint stiffen up in am and if sitting to long.     Review of Motor symptoms:  Tremor:  Location- denies, feels a little shake in hands when meds wear off                 Rest/postural/action- denies  Stiffness/rigidity: yes, stiff in thighs,  Slowness:  mild  Trouble walking:   good, feels that great apart from in am. Then he does shuffle some.  Freezing of gait:  denies, but feels like heavy in am before meds kick in.   Balance problems:  has gotten really good  Falls:  denies  R toes cramp up, but less so now.   Changes in speech:  usually good, slurs when meds wear off  Swallowing difficulties:  denies, does have a lot of phlegm,   Activities of daily living (buttoning clothes, bathing, cutting food, etc):  independent    Review of Non-Motor symptoms:  Cognition:  Memory- memory is fine, has some selective memory issues.  MoCA 25/30 in 07/2023.          Hallucinations-  denies  Has random back pain - relieved by sitting down, right in the middle of the back.          Mood:        Depression-  good                       Anxiety: good                       Obsessive/compulsive behaviors- some obsession w PD. Bought a lot of TVs and fixes them.  Has not done that much lately. He does get obsessed about things - he is now sanding the deck - that is just his personality - has always has perfectionist, since he was a kid,   Sleep disturbances including REM behavior disorder: takes melatonin,   Sensory changes (ie, smell or taste):  Hyposmia  Gastrointestinal complaints/Constipation:  not really -maybe a little more now that on iron supplements.   Urinary retention or frequency:  sometimes low flow.   Positional lightheadedness and/or syncope:  denies  Excessive saliva/drooling:  denies. But has phlegm, but less than prior.   Fatigue:  denies      Patient Active Problem List   Diagnosis   • Depression   • Insomnia   • Parkinson  disease (Multi)      Past Medical History:   Diagnosis Date   • Personal history of other diseases of the digestive system     History of hiatal hernia   • Personal history of other diseases of the digestive system     History of gastroesophageal reflux (GERD)      Past Surgical History:   Procedure Laterality Date   • OTHER SURGICAL HISTORY  10/29/2019    Ankle surgery      Social History     Socioeconomic History   • Marital status:      Spouse name: Not on file   • Number of children: Not on file   • Years of education: Not on file   • Highest education level: Not on file   Occupational History   • Not on file   Tobacco Use   • Smoking status: Former     Types: Cigarettes   • Smokeless tobacco: Never   Substance and Sexual Activity   • Alcohol use: Never   • Drug use: Never   • Sexual activity: Not on file   Other Topics Concern   • Not on file   Social History Narrative   • Not on file     Social Determinants of Health     Financial Resource Strain: Not on file   Food Insecurity: Not on file   Transportation Needs: Not on file   Physical Activity: Not on file   Stress: Not on file   Social Connections: Not on file   Intimate Partner Violence: Not on file   Housing Stability: Not on file      No family history on file.   Patient Health Questionnaire-2 Score: 0          Review of Systems  All other system have been reviewed and are negative for complaint.  Objective  Vitals:    08/06/24 1124   BP: 117/77   Pulse: 63        Neurological Exam      MDS UPDRS 1st Score: Motor Examination  Is the patient on medication for treating the symptoms of Parkinson's Disease?: Yes  Patients receiving medication for treating the symptoms of Parkinson's Disease, aliyah the patient's clinical state.: On  Is the patient on Levodopa?: Yes  Speech: 0  Facial Expression: 0  Rigidty Neck: 1  Rigidty RUE: 1  Rigidity - LUE: 1  Rigidity RLE: 2  Rigidity LLE: 2  Finger Tapping Right Hand: 2  Finger Tapping Left Hand: 1  Hand  Movements- Right Hand: 1  Hand Movements- Left Hand: 1  Pronatiaon-Supination Movments - Right Hand: 0  Pronatiaon-Supination Movments Left Hand: 0  Toe Tapping Right Foot: 0  Toe Tapping - Left Foot: 1  Leg Agility - Right Le  Leg Agility - Left le  Arising from Chair: 0  Gait: 0  Freezing of Gait: 0  Postural Stability: 1  Posture: 0  Global Spontanteity of Movment ( Body Bradykinesia): 0  Postural Tremor - Right Hand: 0  Postural Tremor - Left hand: 0  Kinetic Tremor - Right hand: 0  Kinetic Tremor - Left hand: 0  Rest Tremor Amplitude - RUE: 0  Rest Tremor Amplitude - LUE: 0  Rest Tremor Amplitude - RLE: 0  Rest Tremor Amplitude - LLE: 0  Rest Tremor Amplitude - Lip/Jaw: 0  Constancy of Rest Tremor: 0  MDS UPDRS Total Score: 14  Were dyskinesias (chorea or dystonia) present during examination?: Yes  Did these movements interfere with your rating?: No                           Assessment/Plan      Ray Beckford Jr. is a 69 y.o. year old male here for Mr. Ray Beckford . is a 68 y.o. M  with Parkinson's disease (idiopathic) He arrives for follow up. Has had significant improvement in symptoms since starting levodopa and really likes the current medication schedule, although quite complex.  Would change to Rytary next. No Inbrija--dx with COPD, used to be on inhalers but no longer.       Plan:  Continue current medications as listed above  Advised re ICD again, does have a somewhat hyperfocused view of PD, but has always been his personality,Continue to exercise  Consider Rytary next, can always consider Inbrijia since no longer on any inhalers, if discuss w his PCP. (Am's are his hardest times)  RTC in 6 months,   Continue to exercise.

## 2024-08-06 NOTE — PROGRESS NOTES
Subjective     Ray Beckford Jr. is a right handed  69 y.o. year old male who presents with Med Refill and Parkinson's Disease (Patient reports blood work shows low iron.). Patient is accompanied by: spouse  Visit type: follow up visit       He feel he is doing well.  Has been doing [employer] 2 times per week. He rides a bike every morning to get his heart rate up.     He has developed a rather complex medication regimen, see below, but feels it works really well. Appears somewhat obsessive about his Parkinson's disease.     He has been using vibrating gloves 1/2 hour per day - he finds it helps his sleep.     Med Dose Time   Carbidopa/levodopa 25/100mg 2 tabs at 7 am - 2 tab at 10 am - 2 tabs at 2 pm - 2 tabs at 6 pm, 2 tab at 9 pm    Carbidopa/levodopa CR 50/200mg  1 tab at 10 am and 1 tab at bedtime 11pm   Selegilene 5mg  1 tab daily in the am     Pramipexole: 0.5mg  1 tab 3 times a day (7 am - 2 pm and 6pm)   Entacapone 200 mhg  4 times a day - 7 -10-1 and 6 pm      Also takes several supplements - fish oil, Vitamin E, Olive leaf extract,   Also takes Nasacort as well as iron supplements.   Takes Melatonin 5 mg occasionally.     Meds are currently lasting from one dose to the next.   No dyskinesias.   Kick in: 30 mins -1 hour.   When he first wakes up in am - he feels that his legs are glued to the ground,   Once he takes his meds he is doing okay.   Downtime: 60 mins in am as well as at 10 am for 30 mins.   Joint stiffen up in am and if sitting to long.     Review of Motor symptoms:  Tremor:  Location- denies, feels a little shake in hands when meds wear off                 Rest/postural/action- denies  Stiffness/rigidity: yes, stiff in thighs,  Slowness:  mild  Trouble walking:   good, feels that great apart from in am. Then he does shuffle some.  Freezing of gait:  denies, but feels like heavy in am before meds kick in.   Balance problems:  has gotten really good  Falls:  denies  R toes cramp up, but  less so now.   Changes in speech:  usually good, slurs when meds wear off  Swallowing difficulties:  denies, does have a lot of phlegm,   Activities of daily living (buttoning clothes, bathing, cutting food, etc):  independent    Review of Non-Motor symptoms:  Cognition:  Memory- memory is fine, has some selective memory issues.  MoCA 25/30 in 07/2023.          Hallucinations-  denies  Has random back pain - relieved by sitting down, right in the middle of the back.          Mood:        Depression-  good                       Anxiety: good                       Obsessive/compulsive behaviors- some obsession w PD. Bought a lot of TVs and fixes them.  Has not done that much lately. He does get obsessed about things - he is now sanding the deck - that is just his personality - has always has perfectionist, since he was a kid,   Sleep disturbances including REM behavior disorder: takes melatonin,   Sensory changes (ie, smell or taste):  Hyposmia  Gastrointestinal complaints/Constipation:  not really -maybe a little more now that on iron supplements.   Urinary retention or frequency:  sometimes low flow.   Positional lightheadedness and/or syncope:  denies  Excessive saliva/drooling:  denies. But has phlegm, but less than prior.   Fatigue:  denies      Patient Active Problem List   Diagnosis    Depression    Insomnia    Parkinson disease (Multi)      Past Medical History:   Diagnosis Date    Personal history of other diseases of the digestive system     History of hiatal hernia    Personal history of other diseases of the digestive system     History of gastroesophageal reflux (GERD)      Past Surgical History:   Procedure Laterality Date    OTHER SURGICAL HISTORY  10/29/2019    Ankle surgery      Social History     Socioeconomic History    Marital status:      Spouse name: Not on file    Number of children: Not on file    Years of education: Not on file    Highest education level: Not on file   Occupational  History    Not on file   Tobacco Use    Smoking status: Former     Types: Cigarettes    Smokeless tobacco: Never   Substance and Sexual Activity    Alcohol use: Never    Drug use: Never    Sexual activity: Not on file   Other Topics Concern    Not on file   Social History Narrative    Not on file     Social Determinants of Health     Financial Resource Strain: Not on file   Food Insecurity: Not on file   Transportation Needs: Not on file   Physical Activity: Not on file   Stress: Not on file   Social Connections: Not on file   Intimate Partner Violence: Not on file   Housing Stability: Not on file      No family history on file.   Patient Health Questionnaire-2 Score: 0          Review of Systems  All other system have been reviewed and are negative for complaint.  Objective   Vitals:    24 1124   BP: 117/77   Pulse: 63        Neurological Exam      MDS UPDRS 1st Score: Motor Examination  Is the patient on medication for treating the symptoms of Parkinson's Disease?: Yes  Patients receiving medication for treating the symptoms of Parkinson's Disease, aliyah the patient's clinical state.: On  Is the patient on Levodopa?: Yes  Speech: 0  Facial Expression: 0  Rigidty Neck: 1  Rigidty RUE: 1  Rigidity - LUE: 1  Rigidity RLE: 2  Rigidity LLE: 2  Finger Tapping Right Hand: 2  Finger Tapping Left Hand: 1  Hand Movements- Right Hand: 1  Hand Movements- Left Hand: 1  Pronatiaon-Supination Movments - Right Hand: 0  Pronatiaon-Supination Movments Left Hand: 0  Toe Tapping Right Foot: 0  Toe Tapping - Left Foot: 1  Leg Agility - Right Le  Leg Agility - Left le  Arising from Chair: 0  Gait: 0  Freezing of Gait: 0  Postural Stability: 1  Posture: 0  Global Spontanteity of Movment ( Body Bradykinesia): 0  Postural Tremor - Right Hand: 0  Postural Tremor - Left hand: 0  Kinetic Tremor - Right hand: 0  Kinetic Tremor - Left hand: 0  Rest Tremor Amplitude - RUE: 0  Rest Tremor Amplitude - LUE: 0  Rest Tremor Amplitude -  RLE: 0  Rest Tremor Amplitude - LLE: 0  Rest Tremor Amplitude - Lip/Jaw: 0  Constancy of Rest Tremor: 0  MDS UPDRS Total Score: 14  Were dyskinesias (chorea or dystonia) present during examination?: Yes  Did these movements interfere with your rating?: No                           Assessment/Plan       Ray Beckford Jr. is a 69 y.o. year old male here for Mr. Ray Beckford Jr. is a 68 y.o. M  with Parkinson's disease (idiopathic) He arrives for follow up. Has had significant improvement in symptoms since starting levodopa and really likes the current medication schedule, although quite complex.  Would change to Rytary next. No Inbrija--dx with COPD, used to be on inhalers but no longer.       Plan:  Continue current medications as listed above  Advised re ICD again, does have a somewhat hyperfocused view of PD, but has always been his personality,Continue to exercise  Consider Rytary next, can always consider Inbrijia since no longer on any inhalers, if discuss w his PCP. (Am's are his hardest times)  RTC in 6 months,   Continue to exercise.

## 2025-02-05 ENCOUNTER — OFFICE VISIT (OUTPATIENT)
Dept: URGENT CARE | Age: 70
End: 2025-02-05
Payer: MEDICARE

## 2025-02-05 VITALS
TEMPERATURE: 98 F | BODY MASS INDEX: 27.89 KG/M2 | OXYGEN SATURATION: 92 % | DIASTOLIC BLOOD PRESSURE: 91 MMHG | HEART RATE: 75 BPM | RESPIRATION RATE: 16 BRPM | WEIGHT: 200 LBS | SYSTOLIC BLOOD PRESSURE: 157 MMHG

## 2025-02-05 DIAGNOSIS — S76.912A MUSCLE STRAIN OF LEFT THIGH, INITIAL ENCOUNTER: Primary | ICD-10-CM

## 2025-02-05 PROCEDURE — 1160F RVW MEDS BY RX/DR IN RCRD: CPT | Performed by: PHYSICIAN ASSISTANT

## 2025-02-05 PROCEDURE — 1036F TOBACCO NON-USER: CPT | Performed by: PHYSICIAN ASSISTANT

## 2025-02-05 PROCEDURE — 99203 OFFICE O/P NEW LOW 30 MIN: CPT | Performed by: PHYSICIAN ASSISTANT

## 2025-02-05 PROCEDURE — 1159F MED LIST DOCD IN RCRD: CPT | Performed by: PHYSICIAN ASSISTANT

## 2025-02-05 NOTE — PATIENT INSTRUCTIONS
Use heat, Tylenol as needed, and stretches. Rest for 3 days. Follow up here or at your primary doctor if not improving.

## 2025-02-05 NOTE — PROGRESS NOTES
Subjective   Patient ID: Ray Beckford Jr. is a 69 y.o. male. They present today with a chief complaint of left thigh pain.    Patient disposition: Home    HISTORY OF PRESENT ILLNESS:    This is an older adult male with PD. He presents for acute pain in the anterior L hip/thigh for 1 day. Denies injury. He states he did a Parkinson kickboxing class the night before onset. Feels like spasm in thigh muscle aggravated by trying to kick. Able to walk normally. Denies radiation, paresthesias, swelling, hip pain.       Past Medical History  Allergies as of 02/05/2025    (No Known Allergies)       (Not in a hospital admission)       Past Medical History:   Diagnosis Date    Personal history of other diseases of the digestive system     History of hiatal hernia    Personal history of other diseases of the digestive system     History of gastroesophageal reflux (GERD)       Past Surgical History:   Procedure Laterality Date    OTHER SURGICAL HISTORY  10/29/2019    Ankle surgery        reports that he has quit smoking. His smoking use included cigarettes. He has never used smokeless tobacco. He reports that he does not drink alcohol and does not use drugs.    Review of Systems    Negative except as documented in the History of Present Illness.                             Objective    Vitals:    02/05/25 1513   BP: (!) 157/91   Pulse: 75   Resp: 16   Temp: 36.7 °C (98 °F)   SpO2: 92%   Weight: 90.7 kg (200 lb)     No LMP for male patient.      PHYSICAL EXAMINATION:    CONSTITUTIONAL: nontoxic, ambulatory, well-appearing    EYES:  No scleral icterus or orbital trauma noted. No conjunctival injection. PERRLA. EOMI b/l. No nystagmus.    HEENT:  Head and face are unremarkable and atraumatic. Mucous membranes moist. Nares are patent without copious rhinorrhea.  No lymphadenopathy.    CARDIOVASCULAR:  RRR, no m/r/g. Nl S1/S2.     LLE    *Distal pulses intact, capillary refill 1 second in distal digits.    Negative Kathi LLE    LUNGS:   CTAB, no r/r/w. No dullness to percussion.    ABDOMEN:  Nonobese, nonscaphoid..    SKIN: Normal skin exam of LLE    MUSCULOSKELETAL:     LLE    * ROM is FROM w pain on abduction and flexion of L hip/thigh, with pain located at proximal quadriceps focally.     * Tenderness is absent    * Soft tissue swelling is present with 2+ pitting edema equal on bilateral legs    Gait is nl, nonantalgic         NEURO:     LLE    * Motor function is distally intact    * Sensation is distally intact         PSYCH: Appropriate mood and affect.         ---------------------------------------------------------------         MDM:  DVT is less likely bc the exam is not consistent with DVT. Hip joint pain is less likely because of the focal quadriceps location of pain. Muscle strain/spasm is more likely bc the pt engages in kickboxing therapy for PD and pain was very reproduceable. Was advised to use heat, Tylenol PRN, stretch, and fu in a few days if not improving or sooner if worsening.        Procedures    Diagnostic study results (if any) were reviewed by Pete Morfin PA-C.    No results found for this visit on 02/05/25.     Assessment/Plan   Allergies, medications, history, and pertinent labs/EKGs/Imaging reviewed by Pete Morfin PA-C.     Orders and Diagnoses  There are no diagnoses linked to this encounter.    Medical Admin Record      Follow Up Instructions  No follow-ups on file.    Electronically signed by Pete Morfin PA-C  4:02 PM

## 2025-02-06 RX ORDER — HYDROCORTISONE 25 MG/ML
LOTION TOPICAL
COMMUNITY
Start: 2024-09-02

## 2025-02-06 RX ORDER — NYSTATIN 100000 [USP'U]/ML
SUSPENSION ORAL
COMMUNITY
Start: 2024-09-07

## 2025-02-09 DIAGNOSIS — G20.A1 PARKINSON'S DISEASE, UNSPECIFIED WHETHER DYSKINESIA PRESENT, UNSPECIFIED WHETHER MANIFESTATIONS FLUCTUATE: ICD-10-CM

## 2025-02-09 DIAGNOSIS — G20.A1 PARKINSON'S DISEASE (MULTI): ICD-10-CM

## 2025-02-10 ENCOUNTER — OFFICE VISIT (OUTPATIENT)
Dept: NEUROLOGY | Facility: CLINIC | Age: 70
End: 2025-02-10
Payer: MEDICARE

## 2025-02-10 VITALS
WEIGHT: 207 LBS | SYSTOLIC BLOOD PRESSURE: 127 MMHG | BODY MASS INDEX: 28.98 KG/M2 | HEIGHT: 71 IN | HEART RATE: 73 BPM | DIASTOLIC BLOOD PRESSURE: 69 MMHG

## 2025-02-10 DIAGNOSIS — R49.8 HYPOPHONIA: ICD-10-CM

## 2025-02-10 DIAGNOSIS — R13.10 DYSPHAGIA, UNSPECIFIED TYPE: ICD-10-CM

## 2025-02-10 DIAGNOSIS — G20.B2 PARKINSON'S DISEASE WITH DYSKINESIA AND FLUCTUATING MANIFESTATIONS: Primary | ICD-10-CM

## 2025-02-10 PROCEDURE — 1160F RVW MEDS BY RX/DR IN RCRD: CPT | Performed by: PSYCHIATRY & NEUROLOGY

## 2025-02-10 PROCEDURE — 1036F TOBACCO NON-USER: CPT | Performed by: PSYCHIATRY & NEUROLOGY

## 2025-02-10 PROCEDURE — 1159F MED LIST DOCD IN RCRD: CPT | Performed by: PSYCHIATRY & NEUROLOGY

## 2025-02-10 PROCEDURE — G2211 COMPLEX E/M VISIT ADD ON: HCPCS | Performed by: PSYCHIATRY & NEUROLOGY

## 2025-02-10 PROCEDURE — 99213 OFFICE O/P EST LOW 20 MIN: CPT | Performed by: PSYCHIATRY & NEUROLOGY

## 2025-02-10 PROCEDURE — 3008F BODY MASS INDEX DOCD: CPT | Performed by: PSYCHIATRY & NEUROLOGY

## 2025-02-10 RX ORDER — PRAMIPEXOLE DIHYDROCHLORIDE 0.5 MG/1
0.5 TABLET ORAL 3 TIMES DAILY
Qty: 270 TABLET | Refills: 1 | Status: SHIPPED | OUTPATIENT
Start: 2025-02-10

## 2025-02-10 RX ORDER — CARBIDOPA AND LEVODOPA 50; 200 MG/1; MG/1
1 TABLET, EXTENDED RELEASE ORAL 2 TIMES DAILY
Qty: 180 TABLET | Refills: 1 | Status: SHIPPED | OUTPATIENT
Start: 2025-02-10

## 2025-02-10 RX ORDER — CARBIDOPA AND LEVODOPA 25; 100 MG/1; MG/1
TABLET ORAL
Qty: 720 TABLET | Refills: 3 | Status: SHIPPED | OUTPATIENT
Start: 2025-02-10

## 2025-02-10 ASSESSMENT — UNIFIED PARKINSONS DISEASE RATING SCALE (UPDRS)
AMPLITUDE_LIP_JAW: 0
AMPLITUDE_RLE: 0
RIGIDITY_RLE: 2
CLINICAL_STATE: ON
LEG_AGILITY_LEFT: 0
SPEECH: 1
RIGIDITY_NECK: 1
AMPLITUDE_LLE: 0
AMPLITUDE_RUE: 0
POSTURE: 1
AMPLITUDE_LUE: 0
LEVODOPA: YES
MINUTES_SINCE_LEVODOPA: 120 MINS
LEG_AGILITY_RIGHT: 0
HANDMOVEMENTS_RIGHT: 1
RIGIDITY_LLE: 2
FINGER_TAPPING_RIGHT: 2
GAIT: 2
TOETAPPING_RIGHT: 0
CONSTANCY_TREMOR_ATREST: 0
KINETIC_TREMOR_RIGHTHAND: 0
PARKINSONS_MEDS: YES
POSTURAL_STABILITY: 0
TOTAL_SCORE: 23
KINETIC_TREMOR_LEFTHAND: 0
RIGIDITY_LUE: 2
PRONATION_SUPINATION_RIGHT: 0
PRONATION_SUPINATION_LEFT: 0
CHAIR_RISING_SCALE: 1
POSTURAL_TREMOR_LEFTHAND: 0
POSTURAL_TREMOR_RIGHTHAND: 0
DYSKINESIAS_PRESENT: NO
FREEZING_GAIT: 0
SPONTANEITY_OF_MOVEMENT: 2
RIGIDITY_RUE: 1
FINGER_TAPPING_LEFT: 1
TOETAPPING_LEFT: 1
FACIAL_EXPRESSION: 1

## 2025-02-10 ASSESSMENT — PATIENT HEALTH QUESTIONNAIRE - PHQ9
2. FEELING DOWN, DEPRESSED OR HOPELESS: NOT AT ALL
SUM OF ALL RESPONSES TO PHQ9 QUESTIONS 1 AND 2: 0
1. LITTLE INTEREST OR PLEASURE IN DOING THINGS: NOT AT ALL

## 2025-02-10 ASSESSMENT — ANXIETY QUESTIONNAIRES
IF YOU CHECKED OFF ANY PROBLEMS ON THIS QUESTIONNAIRE, HOW DIFFICULT HAVE THESE PROBLEMS MADE IT FOR YOU TO DO YOUR WORK, TAKE CARE OF THINGS AT HOME, OR GET ALONG WITH OTHER PEOPLE: NOT DIFFICULT AT ALL
2. NOT BEING ABLE TO STOP OR CONTROL WORRYING: NOT AT ALL
4. TROUBLE RELAXING: NOT AT ALL
1. FEELING NERVOUS, ANXIOUS, OR ON EDGE: NOT AT ALL
GAD7 TOTAL SCORE: 0
3. WORRYING TOO MUCH ABOUT DIFFERENT THINGS: NOT AT ALL
6. BECOMING EASILY ANNOYED OR IRRITABLE: NOT AT ALL
7. FEELING AFRAID AS IF SOMETHING AWFUL MIGHT HAPPEN: NOT AT ALL
5. BEING SO RESTLESS THAT IT IS HARD TO SIT STILL: NOT AT ALL

## 2025-02-10 ASSESSMENT — ENCOUNTER SYMPTOMS
DEPRESSION: 0
OCCASIONAL FEELINGS OF UNSTEADINESS: 0
LOSS OF SENSATION IN FEET: 0

## 2025-02-10 NOTE — PROGRESS NOTES
Subjective     Ray Beckford Jr. is a right handed  69 y.o. year old male who presents with Parkinson's Disease. Patient is accompanied by: spouse  Visit type: follow up visit     He feel he is doing okay.  Has been doing [employer] boxing 2 times per week. He rides a bike every morning to get his heart rate up.     He has developed a rather complex medication regimen, see below .   He feels he does really well apart from between 930 am and 11 am.   He feels really good after he takes his 7 am meds, but he feels bad around the time above.  He has moved the selegiline to lunch hor as well.   He feels that the current medication regimen helps him a lot.     Med Dose Time   Carbidopa/levodopa 25/100mg 2 tabs at 7 am - 2 tab at 10 am - 2 tabs at 2 pm - 2 tabs at 6 pm, 2 tab at 9 pm Roughly every 3 hours.    Carbidopa/levodopa CR 50/200mg  1 tab at bedtime 11pm   Selegilene 5mg  1 tab daily in at lunch     Pramipexole: 0.5mg  1 tab 3 times a day (he splits the am dose into two - as he feels that he gets stiff and shaky if he takes it all at same time.  (7 am - 2 pm and 6pm)   Entacapone 200 mhg  4 times a day, (also does 1/2 tab in am twice a day with the pramipexole)      Also takes several supplements - fish oil, Vitamin E, Olive leaf extract,   Also takes Nasacort as well as iron supplements.   Takes Melatonin 5 mg occasionally.     Meds are currently lasting from one dose to the next apart from the am dosing.   No dyskinesias.   Kick in: 30 mins -1 hour.   When he first wakes up in am - he feels that he is shuffling and has reduced arm swing.   Downtime: between 930 and 11 am, but better since doing the schedule listed above.     Review of Motor symptoms:  Tremor:  Location- denies, feels a little shake in hands when meds wear off, especially in the am                  Rest/postural/action-resting  Stiffness/rigidity: yes, stiff in thighs.   Slowness:  mild  Trouble walking:   good, feels that great apart from in am.  Then he does shuffle some.  Freezing of gait:  denies, but feels like heavy in am before meds kick in.   Balance problems:  has gotten really good  Falls:  denies  Changes in speech:  usually good, slurs when meds wear off  Swallowing difficulties:  denies,  Activities of daily living (buttoning clothes, bathing, cutting food, etc):  independent    Review of Non-Motor symptoms:  Cognition:  Memory- memory is fine, has some selective memory issues.  MoCA 25/30 in 07/2023.          Hallucinations-  denies       Mood:        Depression-  good                       Anxiety: good                       Obsessive/compulsive behaviors- some obsession w PD. Bought a lot of TVs and fixes them, but has slowed down on that. He does get obsessed about things -- that is just his personality - has always has perfectionist, since he was a kid,   Sleep disturbances including REM behavior disorder: takes melatonin,   Sensory changes (ie, smell or taste):  Hyposmia  Gastrointestinal complaints/Constipation:  once a week only. Usually the opposite.   Urinary retention or frequency:  sometimes low flow.   Positional lightheadedness and/or syncope:  denies  Excessive saliva/drooling:  denies. But has phlegm, but less than prior.   Fatigue:  denies      Patient Active Problem List   Diagnosis    Depression    Insomnia    Parkinson disease (Multi)      Past Medical History:   Diagnosis Date    Personal history of other diseases of the digestive system     History of hiatal hernia    Personal history of other diseases of the digestive system     History of gastroesophageal reflux (GERD)      Past Surgical History:   Procedure Laterality Date    OTHER SURGICAL HISTORY  10/29/2019    Ankle surgery      Social History     Socioeconomic History    Marital status:      Spouse name: Not on file    Number of children: Not on file    Years of education: Not on file    Highest education level: Not on file   Occupational History    Not on file    Tobacco Use    Smoking status: Former     Types: Cigarettes    Smokeless tobacco: Never   Substance and Sexual Activity    Alcohol use: Never    Drug use: Never    Sexual activity: Not on file   Other Topics Concern    Not on file   Social History Narrative    Not on file     Social Drivers of Health     Financial Resource Strain: Not on file   Food Insecurity: Not on file   Transportation Needs: Not on file   Physical Activity: Not on file   Stress: Not on file   Social Connections: Not on file   Intimate Partner Violence: Not on file   Housing Stability: Not on file      No family history on file.   Patient Health Questionnaire-2 Score: 0          Review of Systems  All other system have been reviewed and are negative for complaint.  Objective   Vitals:    02/10/25 1158   BP: 127/69   Pulse: 73        Neurological Exam      MDS UPDRS 1st Score: Motor Examination  Is the patient on medication for treating the symptoms of Parkinson's Disease?: Yes  Patients receiving medication for treating the symptoms of Parkinson's Disease, aliyah the patient's clinical state.: On  Is the patient on Levodopa?: Yes  Minutes since last Levodopa dose: 120 mins  Speech: 1  Facial Expression: 1  Rigidty Neck: 1  Rigidty RUE: 1  Rigidity - LUE: 2  Rigidity RLE: 2  Rigidity LLE: 2  Finger Tapping Right Hand: 2  Finger Tapping Left Hand: 1  Hand Movements- Right Hand: 1  Hand Movements- Left Hand: 2  Pronatiaon-Supination Movments - Right Hand: 0  Pronatiaon-Supination Movments Left Hand: 0  Toe Tapping Right Foot: 0  Toe Tapping - Left Foot: 1  Leg Agility - Right Le  Leg Agility - Left le  Arising from Chair: 1  Gait: 2  Freezing of Gait: 0  Postural Stability: 0  Posture: 1  Global Spontanteity of Movment ( Body Bradykinesia): 2  Postural Tremor - Right Hand: 0  Postural Tremor - Left hand: 0  Kinetic Tremor - Right hand: 0  Kinetic Tremor - Left hand: 0  Rest Tremor Amplitude - RUE: 0  Rest Tremor Amplitude - LUE: 0  Rest  Tremor Amplitude - RLE: 0  Rest Tremor Amplitude - LLE: 0  Rest Tremor Amplitude - Lip/Jaw: 0  Constancy of Rest Tremor: 0  MDS UPDRS Total Score: 23  Were dyskinesias (chorea or dystonia) present during examination?: No                           Assessment/Plan     Ray Beckford Jr. is a 69 y.o. year old male here for Mr. Ray Beckford Jr. is a 68 y.o. M  with Parkinson's disease (idiopathic) He arrives for follow up. Has had significant improvement in symptoms since starting levodopa and really likes the current medication schedule, although quite complex.  I discussed w patient that I think changing to Crexont could be very beneficial for him, and he wants to think about this, and investigate it. He is also having some hypophonia and is working w [employer] for same, as well as some dysphagia, so will investigate this as well.   Remains quite hyperfocused on his PD and medication regimen, but states has always been detail oriented/perfectionist, so do not think med side effect.       Plan:    Med Dose Time   Carbidopa/levodopa 25/100mg 2 tabs at 7 am - 2 tab at 10 am - 2 tabs at 2 pm - 2 tabs at 6 pm, 2 tab at 9 pm Roughly every 3 hours.    Carbidopa/levodopa CR 50/200mg  1 tab at bedtime 11pm   Selegilene 5mg  1 tab daily in at lunch     Pramipexole: 0.5mg  1 tab 3 times a day (he splits the am dose into two - as he feels that he gets stiff and shaky if he takes it all at same time.  (7 am - 2 pm and 6pm)   Entacapone 200 mhg  4 times a day, (also does 1/2 tab in am twice a day with the pramipexole)        I would like you to consider changing to Crexont - this is a newer formulation of carbidopa/levodopa    I have ordered a Modified Barium Swallow test for the dysphagia and hypophonia.     Continue to exercise    Should do MoCA at next visit.     RTC in 6 months

## 2025-02-10 NOTE — LETTER
February 10, 2025     Alfonso Choe MD  [address] [facility] [address]  As Of 11/14/2020  [facility] [id number]    Patient: Ray Beckford Jr.   YOB: 1955   Date of Visit: 2/10/2025       Dear Dr. Alfonso Choe MD:    Thank you for referring Ray Beckford to me for evaluation. Below are my notes for this consultation.  If you have questions, please do not hesitate to call me. I look forward to following your patient along with you.       Sincerely,     Marjorie Garg MD      CC: No Recipients  ______________________________________________________________________________________    Subjective    Ray Beckford Jr. is a right handed  69 y.o. year old male who presents with Parkinson's Disease. Patient is accompanied by: spouse  Visit type: follow up visit     He feel he is doing okay.  Has been doing [employer] boxing 2 times per week. He rides a bike every morning to get his heart rate up.     He has developed a rather complex medication regimen, see below .   He feels he does really well apart from between 930 am and 11 am.   He feels really good after he takes his 7 am meds, but he feels bad around the time above.  He has moved the selegiline to lunch hor as well.   He feels that the current medication regimen helps him a lot.     Med Dose Time   Carbidopa/levodopa 25/100mg 2 tabs at 7 am - 2 tab at 10 am - 2 tabs at 2 pm - 2 tabs at 6 pm, 2 tab at 9 pm Roughly every 3 hours.    Carbidopa/levodopa CR 50/200mg  1 tab at bedtime 11pm   Selegilene 5mg  1 tab daily in at lunch     Pramipexole: 0.5mg  1 tab 3 times a day (he splits the am dose into two - as he feels that he gets stiff and shaky if he takes it all at same time.  (7 am - 2 pm and 6pm)   Entacapone 200 mhg  4 times a day, (also does 1/2 tab in am twice a day with the pramipexole)      Also takes several supplements - fish oil, Vitamin E, Olive leaf extract,   Also takes Nasacort as well as iron supplements.   Takes Melatonin 5 mg occasionally.      Meds are currently lasting from one dose to the next apart from the am dosing.   No dyskinesias.   Kick in: 30 mins -1 hour.   When he first wakes up in am - he feels that he is shuffling and has reduced arm swing.   Downtime: between 930 and 11 am, but better since doing the schedule listed above.     Review of Motor symptoms:  Tremor:  Location- denies, feels a little shake in hands when meds wear off, especially in the am                  Rest/postural/action-resting  Stiffness/rigidity: yes, stiff in thighs.   Slowness:  mild  Trouble walking:   good, feels that great apart from in am. Then he does shuffle some.  Freezing of gait:  denies, but feels like heavy in am before meds kick in.   Balance problems:  has gotten really good  Falls:  denies  Changes in speech:  usually good, slurs when meds wear off  Swallowing difficulties:  denies,  Activities of daily living (buttoning clothes, bathing, cutting food, etc):  independent    Review of Non-Motor symptoms:  Cognition:  Memory- memory is fine, has some selective memory issues.  MoCA 25/30 in 07/2023.          Hallucinations-  denies       Mood:        Depression-  good                       Anxiety: good                       Obsessive/compulsive behaviors- some obsession w PD. Bought a lot of TVs and fixes them, but has slowed down on that. He does get obsessed about things -- that is just his personality - has always has perfectionist, since he was a kid,   Sleep disturbances including REM behavior disorder: takes melatonin,   Sensory changes (ie, smell or taste):  Hyposmia  Gastrointestinal complaints/Constipation:  once a week only. Usually the opposite.   Urinary retention or frequency:  sometimes low flow.   Positional lightheadedness and/or syncope:  denies  Excessive saliva/drooling:  denies. But has phlegm, but less than prior.   Fatigue:  denies      Patient Active Problem List   Diagnosis   • Depression   • Insomnia   • Parkinson disease (Multi)       Past Medical History:   Diagnosis Date   • Personal history of other diseases of the digestive system     History of hiatal hernia   • Personal history of other diseases of the digestive system     History of gastroesophageal reflux (GERD)      Past Surgical History:   Procedure Laterality Date   • OTHER SURGICAL HISTORY  10/29/2019    Ankle surgery      Social History     Socioeconomic History   • Marital status:      Spouse name: Not on file   • Number of children: Not on file   • Years of education: Not on file   • Highest education level: Not on file   Occupational History   • Not on file   Tobacco Use   • Smoking status: Former     Types: Cigarettes   • Smokeless tobacco: Never   Substance and Sexual Activity   • Alcohol use: Never   • Drug use: Never   • Sexual activity: Not on file   Other Topics Concern   • Not on file   Social History Narrative   • Not on file     Social Drivers of Health     Financial Resource Strain: Not on file   Food Insecurity: Not on file   Transportation Needs: Not on file   Physical Activity: Not on file   Stress: Not on file   Social Connections: Not on file   Intimate Partner Violence: Not on file   Housing Stability: Not on file      No family history on file.   Patient Health Questionnaire-2 Score: 0          Review of Systems  All other system have been reviewed and are negative for complaint.  Objective  Vitals:    02/10/25 1158   BP: 127/69   Pulse: 73        Neurological Exam      MDS UPDRS 1st Score: Motor Examination  Is the patient on medication for treating the symptoms of Parkinson's Disease?: Yes  Patients receiving medication for treating the symptoms of Parkinson's Disease, aliyah the patient's clinical state.: On  Is the patient on Levodopa?: Yes  Minutes since last Levodopa dose: 120 mins  Speech: 1  Facial Expression: 1  Rigidty Neck: 1  Rigidty RUE: 1  Rigidity - LUE: 2  Rigidity RLE: 2  Rigidity LLE: 2  Finger Tapping Right Hand: 2  Finger Tapping Left  Hand: 1  Hand Movements- Right Hand: 1  Hand Movements- Left Hand: 2  Pronatiaon-Supination Movments - Right Hand: 0  Pronatiaon-Supination Movments Left Hand: 0  Toe Tapping Right Foot: 0  Toe Tapping - Left Foot: 1  Leg Agility - Right Le  Leg Agility - Left le  Arising from Chair: 1  Gait: 2  Freezing of Gait: 0  Postural Stability: 0  Posture: 1  Global Spontanteity of Movment ( Body Bradykinesia): 2  Postural Tremor - Right Hand: 0  Postural Tremor - Left hand: 0  Kinetic Tremor - Right hand: 0  Kinetic Tremor - Left hand: 0  Rest Tremor Amplitude - RUE: 0  Rest Tremor Amplitude - LUE: 0  Rest Tremor Amplitude - RLE: 0  Rest Tremor Amplitude - LLE: 0  Rest Tremor Amplitude - Lip/Jaw: 0  Constancy of Rest Tremor: 0  MDS UPDRS Total Score: 23  Were dyskinesias (chorea or dystonia) present during examination?: No                           Assessment/Plan    Ray Beckford Jr. is a 69 y.o. year old male here for Mr. Ray Beckford Jr. is a 68 y.o. M  with Parkinson's disease (idiopathic) He arrives for follow up. Has had significant improvement in symptoms since starting levodopa and really likes the current medication schedule, although quite complex.  I discussed w patient that I think changing to Crexont could be very beneficial for him, and he wants to think about this, and investigate it. He is also having some hypophonia and is working w [employer] for same, as well as some dysphagia, so will investigate this as well.   Remains quite hyperfocused on his PD and medication regimen, but states has always been detail oriented/perfectionist, so do not think med side effect.       Plan:    Med Dose Time   Carbidopa/levodopa 25/100mg 2 tabs at 7 am - 2 tab at 10 am - 2 tabs at 2 pm - 2 tabs at 6 pm, 2 tab at 9 pm Roughly every 3 hours.    Carbidopa/levodopa CR 50/200mg  1 tab at bedtime 11pm   Selegilene 5mg  1 tab daily in at lunch     Pramipexole: 0.5mg  1 tab 3 times a day (he splits the am dose into two - as he feels  that he gets stiff and shaky if he takes it all at same time.  (7 am - 2 pm and 6pm)   Entacapone 200 mhg  4 times a day, (also does 1/2 tab in am twice a day with the pramipexole)        I would like you to consider changing to Crexont - this is a newer formulation of carbidopa/levodopa    I have ordered a Modified Barium Swallow test for the dysphagia and hypophonia.     Continue to exercise    Should do MoCA at next visit.     RTC in 6 months

## 2025-02-10 NOTE — PATIENT INSTRUCTIONS
It was nice to see you today.       Med Dose Time   Carbidopa/levodopa 25/100mg 2 tabs at 7 am - 2 tab at 10 am - 2 tabs at 2 pm - 2 tabs at 6 pm, 2 tab at 9 pm Roughly every 3 hours.    Carbidopa/levodopa CR 50/200mg  1 tab at bedtime 11pm   Selegilene 5mg  1 tab daily in at lunch     Pramipexole: 0.5mg  1 tab 3 times a day (he splits the am dose into two - as he feels that he gets stiff and shaky if he takes it all at same time.  (7 am - 2 pm and 6pm)   Entacapone 200 mhg  4 times a day, (also does 1/2 tab in am twice a day with the pramipexole)        I would like you to consider changing to Crexont - this is a newer formulation of carbidopa/levodopa    I have ordered a Modified Barium Swallow test for the dysphagia and hypophonia.     Continue to exercise    RTC in 6 months

## 2025-02-12 ENCOUNTER — TELEPHONE (OUTPATIENT)
Dept: NEUROLOGY | Facility: CLINIC | Age: 70
End: 2025-02-12
Payer: COMMERCIAL

## 2025-02-12 NOTE — TELEPHONE ENCOUNTER
Ray Beckford Jr. called. He saw Dr Garg on Monday 02/10/25. He forgot to mention that he dislikes the selegiline it causes him to feel jittery. He does not see benefit in his symptoms either. He would like to know how to safely come off this medication. Please advise and have Harrison call back with instructions. [phone]

## 2025-02-13 DIAGNOSIS — G20.B2 PARKINSON'S DISEASE WITH DYSKINESIA AND FLUCTUATING MANIFESTATIONS: Primary | ICD-10-CM

## 2025-02-13 RX ORDER — SELEGILINE HYDROCHLORIDE 5 MG/1
5 TABLET ORAL EVERY MORNING
Qty: 30 TABLET | Refills: 0 | Status: SHIPPED | OUTPATIENT
Start: 2025-02-13 | End: 2026-02-13

## 2025-02-14 NOTE — TELEPHONE ENCOUNTER
I called the patient and let him know a new RX for selegeline tablets was sent to his local pharmacy and that Dr Crowe did not anticipate any different side effect than with the capsules. He says he will continue with the capsules for a week having changed to taking them early in the am. Will see how he feels about trying tablets next week.

## 2025-02-19 ENCOUNTER — TELEPHONE (OUTPATIENT)
Dept: NEUROLOGY | Facility: CLINIC | Age: 70
End: 2025-02-19
Payer: COMMERCIAL

## 2025-02-19 NOTE — TELEPHONE ENCOUNTER
Called the ptatinet back - his devices need recharged, one is down to 5%. Advised to recharge to 100%, tuen off & turn back on and try reconnecting per Dr Garg.

## 2025-02-19 NOTE — TELEPHONE ENCOUNTER
Ray Beckford Jr. called. He would like to touch base regarding his selegiline. He wanted to give you an update. Please call [phone] (home)

## 2025-02-20 ENCOUNTER — APPOINTMENT (OUTPATIENT)
Dept: RADIOLOGY | Facility: HOSPITAL | Age: 70
End: 2025-02-20
Payer: MEDICARE

## 2025-02-26 ENCOUNTER — APPOINTMENT (OUTPATIENT)
Dept: RADIOLOGY | Facility: HOSPITAL | Age: 70
End: 2025-02-26
Payer: MEDICARE

## 2025-03-04 ENCOUNTER — OFFICE VISIT (OUTPATIENT)
Dept: NEUROLOGY | Facility: CLINIC | Age: 70
End: 2025-03-04
Payer: MEDICARE

## 2025-03-04 ENCOUNTER — TELEPHONE (OUTPATIENT)
Dept: NEUROLOGY | Facility: CLINIC | Age: 70
End: 2025-03-04
Payer: MEDICARE

## 2025-03-04 VITALS — DIASTOLIC BLOOD PRESSURE: 91 MMHG | HEART RATE: 73 BPM | SYSTOLIC BLOOD PRESSURE: 139 MMHG

## 2025-03-04 DIAGNOSIS — F41.9 ANXIETY AND DEPRESSION: ICD-10-CM

## 2025-03-04 DIAGNOSIS — R49.8 HYPOPHONIA: ICD-10-CM

## 2025-03-04 DIAGNOSIS — G20.A1 PARKINSON'S DISEASE (MULTI): ICD-10-CM

## 2025-03-04 DIAGNOSIS — G20.A1 PARKINSON'S DISEASE, UNSPECIFIED WHETHER DYSKINESIA PRESENT, UNSPECIFIED WHETHER MANIFESTATIONS FLUCTUATE: Primary | ICD-10-CM

## 2025-03-04 DIAGNOSIS — F32.A ANXIETY AND DEPRESSION: ICD-10-CM

## 2025-03-04 PROCEDURE — 99215 OFFICE O/P EST HI 40 MIN: CPT | Performed by: PSYCHIATRY & NEUROLOGY

## 2025-03-04 PROCEDURE — 1159F MED LIST DOCD IN RCRD: CPT | Performed by: PSYCHIATRY & NEUROLOGY

## 2025-03-04 PROCEDURE — 1160F RVW MEDS BY RX/DR IN RCRD: CPT | Performed by: PSYCHIATRY & NEUROLOGY

## 2025-03-04 PROCEDURE — 1036F TOBACCO NON-USER: CPT | Performed by: PSYCHIATRY & NEUROLOGY

## 2025-03-04 PROCEDURE — G2211 COMPLEX E/M VISIT ADD ON: HCPCS | Performed by: PSYCHIATRY & NEUROLOGY

## 2025-03-04 RX ORDER — SERTRALINE HYDROCHLORIDE 50 MG/1
50 TABLET, FILM COATED ORAL DAILY
Qty: 30 TABLET | Refills: 11 | Status: SHIPPED | OUTPATIENT
Start: 2025-03-04 | End: 2026-03-04

## 2025-03-04 RX ORDER — CARBIDOPA AND LEVODOPA 87.5; 35 MG/1; MG/1
2 CAPSULE, EXTENDED RELEASE ORAL 3 TIMES DAILY
Qty: 180 EACH | Refills: 3 | Status: SHIPPED | OUTPATIENT
Start: 2025-03-04 | End: 2026-03-04

## 2025-03-04 RX ORDER — CARBIDOPA AND LEVODOPA 50; 200 MG/1; MG/1
1 TABLET, EXTENDED RELEASE ORAL NIGHTLY
Qty: 90 TABLET | Refills: 3 | Status: SHIPPED | OUTPATIENT
Start: 2025-03-04 | End: 2026-03-04

## 2025-03-04 RX ORDER — LORAZEPAM 0.5 MG/1
0.5 TABLET ORAL EVERY 8 HOURS PRN
Qty: 10 TABLET | Refills: 0 | Status: SHIPPED | OUTPATIENT
Start: 2025-03-04

## 2025-03-04 ASSESSMENT — UNIFIED PARKINSONS DISEASE RATING SCALE (UPDRS)
CLINICAL_STATE: ON
POSTURAL_TREMOR_RIGHTHAND: 0
AMPLITUDE_LLE: 0
TOETAPPING_RIGHT: 0
FACIAL_EXPRESSION: 1
AMPLITUDE_RLE: 0
KINETIC_TREMOR_RIGHTHAND: 0
FINGER_TAPPING_RIGHT: 2
GAIT: 2
SPEECH: 1
HANDMOVEMENTS_RIGHT: 2
LEG_AGILITY_RIGHT: 0
FINGER_TAPPING_LEFT: 2
RIGIDITY_RLE: 2
CONSTANCY_TREMOR_ATREST: 0
AMPLITUDE_LUE: 0
LEG_AGILITY_LEFT: 0
RIGIDITY_LLE: 2
POSTURAL_TREMOR_LEFTHAND: 0
DYSKINESIAS_PRESENT: NO
AMPLITUDE_LIP_JAW: 0
POSTURE: 2
CHAIR_RISING_SCALE: 1
FREEZING_GAIT: 0
POSTURAL_STABILITY: 0
SPONTANEITY_OF_MOVEMENT: 3
LEVODOPA: YES
KINETIC_TREMOR_LEFTHAND: 0
PARKINSONS_MEDS: YES
PRONATION_SUPINATION_RIGHT: 2
PRONATION_SUPINATION_LEFT: 1
TOETAPPING_LEFT: 0
RIGIDITY_NECK: 2
AMPLITUDE_RUE: 0
RIGIDITY_RUE: 1

## 2025-03-04 NOTE — TELEPHONE ENCOUNTER
Approved. This drug has been approved under the Member's Medicare Part D benefit for CREXONT Capsule ER 87.5;350MG. Approved quantity: 180 per 30 day(s). You may fill up to a 90 day supply except for those on Specialty Tier 5, which can be filled up to a 30 day supply. Please call the pharmacy to process the prescription claim.  Approval Details    Authorized from March 4, 2025 to December 31, 2099

## 2025-03-04 NOTE — PATIENT INSTRUCTIONS
It was nice to see you today.     Start Crexont 350 - take 2 capsules - three times a day. Take at 7 am -12 noon and 5 pm   Take pramipexole 0.5 mg tab - take 1/2 tab three times a day for one week, after 1 week - take 1/2 tab at bedtime only  - then STOP  STOP Selegiline  STOP entacapone  Continue carbidopa/levodopa 50/200 CR at bedtime.   Sertraline for anxiety - start in 1 week - 50 mg at bedtime.   For the time being till you get the crexont - take carbidopa/levodopa 25/100 - 2 tabs at 7 am - 2 tab at 10 am - 2 tabs at 2 pm - 2 tabs at 6 pm, 2 tab at 9 pm  Other option in the future include Vyalev - levodopa subcut infusions or deep brain stimulation.

## 2025-03-04 NOTE — PROGRESS NOTES
"Subjective     Ray Beckford Jr. is a right handed  69 y.o. year old male who presents with Follow-up (FOLLOW UP). Patient is accompanied by: spouse  Visit type: follow up visit     He was added on to the schedule for today,   He arrives late for his appointment, 15 mins, , and therefor an abreviated visit was done and general PD  visit was done on 02.10.2025.     I spoke to the patient over the weekend due to episodes of dizziness and chest pain and anxiety. Went to ED last weekend and was r/o for a cardiac event.   He went to the ED again yesterday, he had heart racing, shortness of breath etc.   He continues to not take the medications as directed.       He stopped taking selegiline 2 weeks ago and he stopped pramipexole one week ago on his own, has been experimenting with his medication regimen.  Wife states he also does not take meds as prescribed, unclear if has been taking more than prescribed also.   States gets stiff when takes selegiline. States \" I am sorry I got myself into this mess doc\".   Below is the schedule he was on at last visit, which is complex enough as it is, but for last 1-2 weeks he has been taking medications completely differently, has sheets of paper on how he has been taking them, often q1hour or 1/2 tab here and there. It is quite confusing.   When he is off he is very stiff and slow and he finds it hard to get out of the chair, he has FOG.      Med Dose Time   Carbidopa/levodopa 25/100mg 2 tabs at 7 am - 2 tab at 10 am - 2 tabs at 2 pm - 2 tabs at 6 pm, 2 tab at 9 pm Roughly every 3 hours.    Carbidopa/levodopa CR 50/200mg  1 tab at bedtime 11pm         Pramipexole: 0.5mg  1 tab 3 times a day (he splits the am dose into two - as he feels that he gets stiff and shaky if he takes it all at same time.  (7 am - 2 pm and 6pm)   Entacapone 200 mhg  4 times a day, (also does 1/2 tab in am twice a day with the pramipexole)      Also takes several supplements - fish oil, Vitamin E, Olive leaf " extract,   Also takes Nasacort as well as iron supplements.   Takes Melatonin 5 mg occasionally.                 Patient Active Problem List   Diagnosis    Depression    Insomnia    Parkinson disease (Multi)      Past Medical History:   Diagnosis Date    Personal history of other diseases of the digestive system     History of hiatal hernia    Personal history of other diseases of the digestive system     History of gastroesophageal reflux (GERD)      Past Surgical History:   Procedure Laterality Date    OTHER SURGICAL HISTORY  10/29/2019    Ankle surgery      Social History     Socioeconomic History    Marital status:      Spouse name: Not on file    Number of children: Not on file    Years of education: Not on file    Highest education level: Not on file   Occupational History    Not on file   Tobacco Use    Smoking status: Former     Types: Cigarettes    Smokeless tobacco: Never   Substance and Sexual Activity    Alcohol use: Never    Drug use: Never    Sexual activity: Not on file   Other Topics Concern    Not on file   Social History Narrative    Not on file     Social Drivers of Health     Financial Resource Strain: Not on file   Food Insecurity: Not on file   Transportation Needs: Not on file   Physical Activity: Not on file   Stress: Not on file   Social Connections: Not on file   Intimate Partner Violence: Not on file   Housing Stability: Not on file      No family history on file.              Review of Systems  All other system have been reviewed and are negative for complaint.  Objective   Vitals:    03/04/25 1130   BP: (!) 139/91   Pulse: 73          Neurological Exam      Movement specific examination:  Moderate hypophonia and hypomimia  No tremor   Moderate bradykinesia  Stooped, shuffles, turns en block, cannot get out of seat easily.                            Assessment/Plan     Ray Beckford Jr. is a 69 y.o. year old male here for MrFiliberto Beckford Jr. is a 68 y.o. M  with Parkinson's disease  (idiopathic) He arrives for follow up. He has had dramatic worsening of symptoms as well as several ED visits due to chest pain, heart racing etc. He has changed his meds around on own, and has self discontinued several meds as well. 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   We will currently change his medication regimen around:  Plan:    Start Crexont 350 - take 2 capsules - three times a day. Take at 7 am -12 noon and 5 pm   Take pramipexole 0.5 mg tab - take 1/2 tab three times a day for one week, after 1 week - take 1/2 tab at bedtime only  - then STOP (need to wean slowly to help treat the DAWS he has developed from rapid discontinuation of dopamine agonist.   STOP Selegiline  STOP entacapone  Continue carbidopa/levodopa 50/200 CR at bedtime.   Start Sertraline for anxiety - start in 1 week - 50 mg at bedtime.   For the time being till you get the crexont - take carbidopa/levodopa 25/100 - 2 tabs at 7 am - 2 tab at 10 am - 2 tabs at 2 pm - 2 tabs at 6 pm, 2 tab at 9 pm  Other option in the future include Vyalev - levodopa subcut infusions or deep brain stimulation.   Given a small script of ativan for anxiety, advised wife to monitor same and give him these, till we stabilize his symptoms.

## 2025-03-04 NOTE — LETTER
"March 4, 2025     Alfonso Choe MD  [address] [facility] [address]  As Of 11/14/2020  [facility] [id number]    Patient: Ray Beckford Jr.   YOB: 1955   Date of Visit: 3/4/2025       Dear Dr. Alfonso Choe MD:    Thank you for referring Ray Beckford to me for evaluation. Below are my notes for this consultation.  If you have questions, please do not hesitate to call me. I look forward to following your patient along with you.       Sincerely,     Marjorie Garg MD      CC: No Recipients  ______________________________________________________________________________________    Subjective    Ray Beckford Jr. is a right handed  69 y.o. year old male who presents with Follow-up (FOLLOW UP). Patient is accompanied by: spouse  Visit type: follow up visit     He was added on to the schedule for today,   He arrives late for his appointment, 15 mins, , and therefor an abreviated visit was done and general PD  visit was done on 02.10.2025.     I spoke to the patient over the weekend due to episodes of dizziness and chest pain and anxiety. Went to ED last weekend and was r/o for a cardiac event.   He went to the ED again yesterday, he had heart racing, shortness of breath etc.   He continues to not take the medications as directed.       He stopped taking selegiline 2 weeks ago and he stopped pramipexole one week ago on his own, has been experimenting with his medication regimen.  Wife states he also does not take meds as prescribed, unclear if has been taking more than prescribed also.   States gets stiff when takes selegiline. States \" I am sorry I got myself into this mess doc\".   Below is the schedule he was on at last visit, which is complex enough as it is, but for last 1-2 weeks he has been taking medications completely differently, has sheets of paper on how he has been taking them, often q1hour or 1/2 tab here and there. It is quite confusing.   When he is off he is very stiff and slow and he finds it " hard to get out of the chair, he has FOG.      Med Dose Time   Carbidopa/levodopa 25/100mg 2 tabs at 7 am - 2 tab at 10 am - 2 tabs at 2 pm - 2 tabs at 6 pm, 2 tab at 9 pm Roughly every 3 hours.    Carbidopa/levodopa CR 50/200mg  1 tab at bedtime 11pm         Pramipexole: 0.5mg  1 tab 3 times a day (he splits the am dose into two - as he feels that he gets stiff and shaky if he takes it all at same time.  (7 am - 2 pm and 6pm)   Entacapone 200 mhg  4 times a day, (also does 1/2 tab in am twice a day with the pramipexole)      Also takes several supplements - fish oil, Vitamin E, Olive leaf extract,   Also takes Nasacort as well as iron supplements.   Takes Melatonin 5 mg occasionally.                 Patient Active Problem List   Diagnosis   • Depression   • Insomnia   • Parkinson disease (Multi)      Past Medical History:   Diagnosis Date   • Personal history of other diseases of the digestive system     History of hiatal hernia   • Personal history of other diseases of the digestive system     History of gastroesophageal reflux (GERD)      Past Surgical History:   Procedure Laterality Date   • OTHER SURGICAL HISTORY  10/29/2019    Ankle surgery      Social History     Socioeconomic History   • Marital status:      Spouse name: Not on file   • Number of children: Not on file   • Years of education: Not on file   • Highest education level: Not on file   Occupational History   • Not on file   Tobacco Use   • Smoking status: Former     Types: Cigarettes   • Smokeless tobacco: Never   Substance and Sexual Activity   • Alcohol use: Never   • Drug use: Never   • Sexual activity: Not on file   Other Topics Concern   • Not on file   Social History Narrative   • Not on file     Social Drivers of Health     Financial Resource Strain: Not on file   Food Insecurity: Not on file   Transportation Needs: Not on file   Physical Activity: Not on file   Stress: Not on file   Social Connections: Not on file   Intimate Partner  Violence: Not on file   Housing Stability: Not on file      No family history on file.              Review of Systems  All other system have been reviewed and are negative for complaint.  Objective  Vitals:    03/04/25 1130   BP: (!) 139/91   Pulse: 73          Neurological Exam      Movement specific examination:  Moderate hypophonia and hypomimia  No tremor   Moderate bradykinesia  Stooped, shuffles, turns en block, cannot get out of seat easily.                            Assessment/Plan    Ray Beckford Jr. is a 69 y.o. year old male here for Mr. Ray Beckford Jr. is a 68 y.o. M  with Parkinson's disease (idiopathic) He arrives for follow up. He has had dramatic worsening of symptoms as well as several ED visits due to chest pain, heart racing etc. He has changed his meds around on own, and has self discontinued several meds as well. 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   We will currently change his medication regimen around:  Plan:    Start Crexont 350 - take 2 capsules - three times a day. Take at 7 am -12 noon and 5 pm   Take pramipexole 0.5 mg tab - take 1/2 tab three times a day for one week, after 1 week - take 1/2 tab at bedtime only  - then STOP (need to wean slowly to help treat the DAWS he has developed from rapid discontinuation of dopamine agonist.   STOP Selegiline  STOP entacapone  Continue carbidopa/levodopa 50/200 CR at bedtime.   Start Sertraline for anxiety - start in 1 week - 50 mg at bedtime.   For the time being till you get the crexont - take carbidopa/levodopa 25/100 - 2 tabs at 7 am - 2 tab at 10 am - 2 tabs at 2 pm - 2 tabs at 6 pm, 2 tab at 9 pm  Other option in the future include Vyalev - levodopa subcut infusions or deep brain stimulation.   Given a small script of ativan for anxiety, advised wife to monitor same and give him these, till we stabilize his symptoms.

## 2025-03-06 ENCOUNTER — TELEPHONE (OUTPATIENT)
Dept: NEUROLOGY | Facility: CLINIC | Age: 70
End: 2025-03-06
Payer: MEDICARE

## 2025-03-06 NOTE — TELEPHONE ENCOUNTER
I contacted the patient and discussed keeping to the medication regimen prescribed by the provider. He said he was doing better today. I answered is questions to his satisfaction. He will give the regimen a couple of weeks and will consult us before making changes. He wants to stick to sinemet as crexont is $600 per month. I let him know PA was approved and he should contact his insurance regarding preferred pharmacy and cost or level of deductible. I passed along info to [employer] and let him know I would refer to our Specialty Pharm[facility] for copay assistance.

## 2025-03-06 NOTE — TELEPHONE ENCOUNTER
Pt 2nd call today- he leaves a message that he has a left sided weakness and needs a call back. I him back within 5 minutes and find out the weakness, lightheadedness, numbness tingling in toes and fingers of left side started at 2p with pramipexole dose while sitting in his recliner chair; he was afraid to get up; says it resolved in 30 minuets at 230p. He admits to taking pramipexole for years without any sx like this. We discussed need for ED care for any sudden onset weakness, numbness, vision changes, slurred speech. Educated on stroke symptoms. He has not taken his BP- says he will need wife to do it for him. Feeling fine now.

## 2025-03-06 NOTE — TELEPHONE ENCOUNTER
Ray Beckford Jr. called. He would like to updated you on the recent med change. Carbidopa/ Levodopa 25/100 & Pramipexole. Please call [phone] (home)

## 2025-03-10 ENCOUNTER — TELEPHONE (OUTPATIENT)
Dept: NEUROLOGY | Facility: CLINIC | Age: 70
End: 2025-03-10
Payer: MEDICARE

## 2025-03-10 NOTE — TELEPHONE ENCOUNTER
Pt calls and leaves a VM with questions regarding medication regimen. I called back and confirmed that he has the plan outlined by Dr Garg at his visit last week. He confirms he has the plan, but cannot start Crexont due to cost. He is sticking w Sinemet. He agrees to follow the plan for several weeks before requesting changes. He has not followed up on [employer] assistance program not contact his pharmacy about Crexont cost  or copay limit on his plan.

## 2025-03-11 ENCOUNTER — TELEPHONE (OUTPATIENT)
Dept: NEUROLOGY | Facility: CLINIC | Age: 70
End: 2025-03-11
Payer: MEDICARE

## 2025-03-11 DIAGNOSIS — G20.A1 PARKINSON'S DISEASE, UNSPECIFIED WHETHER DYSKINESIA PRESENT, UNSPECIFIED WHETHER MANIFESTATIONS FLUCTUATE: Primary | ICD-10-CM

## 2025-03-11 RX ORDER — LEVODOPA AND CARBIDOPA 195; 48.75 MG/1; MG/1
3 CAPSULE, EXTENDED RELEASE ORAL 4 TIMES DAILY
Qty: 360 CAPSULE | Refills: 11 | Status: SHIPPED | OUTPATIENT
Start: 2025-03-11 | End: 2026-03-11

## 2025-03-11 NOTE — PROGRESS NOTES
Received phone call from pt's wife yesterday evening that Ray is not doing well and that Crexont is 50 + dollars per month.     For now:  advised her to give sinemet 25/100 2.5 tab 5 times a day, continue current dose of pramipexole.   Give 0.25 mg clonazepam BID for now and Start Sertraline.   crexont is not covered, so I will have to try to see if Rytary is cheaper.   Placed order for same.

## 2025-03-11 NOTE — TELEPHONE ENCOUNTER
Ray Beckford leaves additional Voicemails at 4:38 and again at 4:48 asking for response on request to take doses early.

## 2025-03-11 NOTE — TELEPHONE ENCOUNTER
"Pt calls to say that he was \"off from 1215p until his 2pm kicked in\". His 2p dose wore off at at 330p. It is 4 pm now and he does not see how he can make it until next dose at 6p. He wants to know how much more C/L he can take.      "

## 2025-03-11 NOTE — TELEPHONE ENCOUNTER
I called pharmacy to inquire about Rytary RX- they claim not to have recieved the RX despite our getting a confirmation in Epic. I gave them a verbal order. I attempted a PA and got response back that PA was not required.

## 2025-03-12 ENCOUNTER — PATIENT MESSAGE (OUTPATIENT)
Dept: NEUROLOGY | Facility: CLINIC | Age: 70
End: 2025-03-12
Payer: MEDICARE

## 2025-03-12 ENCOUNTER — HOSPITAL ENCOUNTER (OUTPATIENT)
Dept: RADIOLOGY | Facility: HOSPITAL | Age: 70
End: 2025-03-12
Payer: MEDICARE

## 2025-03-12 ENCOUNTER — TELEPHONE (OUTPATIENT)
Dept: NEUROLOGY | Facility: CLINIC | Age: 70
End: 2025-03-12
Payer: MEDICARE

## 2025-03-12 NOTE — PROGRESS NOTES
Called pt back to discuss with him.     Current medication regimen:  Carbidopa/ Levodopa 25/100 2.5 tabs at 7a, 10a, 2p, 6p & 9p  Carbidopa/ Levodopa ER 50/200 at HS at 11 pm  Pramipexole 0,5 mg  1 tab daily  Sertraline 50mg one daily - just started yesterday.   Has clonazepam 0.25 mg - had been helpful.       He feels that meds wears off at 2 hours, he feels stiff in L arm, he is drooling and shuffling.   Has about 1-2 hours of off time between.   He feels that reducing pramipexole helped.       Rytary and Crexont is to expensive.     Plan:  1. Vylaev may be next option as well as DBS - he is not interested at this time.   2. Add entacapone 200 mg w all 5 doses of IR.   3. Continue other meds as per schedule.   4. Advised to stay on this regimen and report back after he keeps a diary in a couple pf days.

## 2025-03-12 NOTE — TELEPHONE ENCOUNTER
Ray Beckford Jr. called. He is taking   Carbidopa/ Levodopa 25/100 2.5 tabs at 6a, 9a, 1p, 5p &8p  Carbidopa/ Levodopa ER 50/200 at HS  Pramipexole 1 tab daily  Sertraline 50mg one daily     Rytary is 800.00 per month ( this is too costly)  Crexont ( too costly)  I spoke with him about keeping a PD diary. Times med taken. On time. Off time and off time symptoms. He will send to the office via My Chart or via fax [phone] on or around March 18.    Dr Garg and Tawanda will review the diary and formulate a plan of care for further med changes at an inperson office visit with Tawanda ( Ray will discuss with wife Sunshine as to which location he would like to schedule with and call me back.)   I did advise to limit the phone calls. He was calling 4-5 times a day.

## 2025-03-19 DIAGNOSIS — G20.B2 PARKINSON'S DISEASE WITH DYSKINESIA AND FLUCTUATING MANIFESTATIONS: Primary | ICD-10-CM

## 2025-03-25 ENCOUNTER — APPOINTMENT (OUTPATIENT)
Dept: RADIOLOGY | Facility: HOSPITAL | Age: 70
End: 2025-03-25
Payer: MEDICARE

## 2025-04-02 ENCOUNTER — HOSPITAL ENCOUNTER (OUTPATIENT)
Dept: RADIOLOGY | Facility: HOSPITAL | Age: 70
Discharge: HOME | End: 2025-04-02
Payer: MEDICARE

## 2025-04-02 DIAGNOSIS — R13.10 DYSPHAGIA, UNSPECIFIED TYPE: ICD-10-CM

## 2025-04-02 DIAGNOSIS — R49.8 HYPOPHONIA: ICD-10-CM

## 2025-04-02 DIAGNOSIS — G20.B2 PARKINSON'S DISEASE WITH DYSKINESIA AND FLUCTUATING MANIFESTATIONS: ICD-10-CM

## 2025-04-02 PROCEDURE — 74230 X-RAY XM SWLNG FUNCJ C+: CPT | Performed by: STUDENT IN AN ORGANIZED HEALTH CARE EDUCATION/TRAINING PROGRAM

## 2025-04-02 PROCEDURE — 92526 ORAL FUNCTION THERAPY: CPT | Mod: GN

## 2025-04-02 PROCEDURE — 74230 X-RAY XM SWLNG FUNCJ C+: CPT

## 2025-04-02 PROCEDURE — 2500000005 HC RX 250 GENERAL PHARMACY W/O HCPCS: Performed by: PSYCHIATRY & NEUROLOGY

## 2025-04-02 PROCEDURE — 92611 MOTION FLUOROSCOPY/SWALLOW: CPT | Mod: GN

## 2025-04-02 RX ADMIN — BARIUM SULFATE 75 ML: 0.81 POWDER, FOR SUSPENSION ORAL at 09:43

## 2025-04-02 NOTE — PROCEDURES
Speech-Language Pathology    Outpatient Modified Barium Swallow Study    Patient Name: Ray Beckford Jr.  MRN: [id number]  : 1955  Today's Date: 25           DIET RECOMMENDATIONS:   - Regular (IDDSI Level 7)  - Thin liquids (IDDSI Level 0) Small sips   Per the results of today's MBSS, patient to continue baseline diet of Regular  consistencies and thin  liquids following swallow strategies listed below:    STRATEGIES:  - Small bites  - Small   sips, segmented sips best  - Double Effortful swallows post sip/bites  - Reflux Precautions  - Medications whole in puree or as best tolerated    Modified Barium Swallow Study completed.   Informed verbal consent obtained prior to completion of exam.   Trials of thin, nectar/mildly thick liquid, honey/moderately thick liquid, puree, regular solids and barium tablet with thin liquid were given.   The study was completed per protocol with various liquid barium consistencies, pudding, solids and a 13mm barium tablet.    A 1.9 cm or .75 inch (outer diameter) ring was placed on the chin in the lateral view and on the lateral, left side of the neck in the a-p view in order to complete objective measurements during swallowing. The anatomic structures and function of the oropharynx, larynx, hypopharynx and cervical esophagus were evaluated.    SLP: Oneida Mendoza SLP   Contact info: [facility] secure chat please.      Reason for Referral:  assess the current swallow function in the setting of Parkinson's disease,   c/o coughing up lots of phlegm .    Pt also with c/o difficulty swallowing chips, rice, crackers  , and pills occasionally.   Pt has not coughed with eating /drinking per the self report in a long time.   Pt  passed the 3 ounce water exam via cup which is he preferred way of drinking  before the MBSS.  Patient Hx:  Parkinson's with dyskinesia and fluctuating  manifestations on Sinemet and Levodopa, large Hiatal Hernia , no infiltrate per CXR completed 25  with min  subsegmental atelectasis at bases.   Respiratory Status: Room air  Current diet: RG7/thins     Pain:  Pain Scale: 0-10  Ratin      SLP PLAN:  Skilled SLP Services: Skilled SLP intervention for dysphagia is warranted.  SLP Frequency: To be determined by treating SLP in outpatient setting  Duration:  Treatment/Interventions:   - Pharyngeal exercises  - Bolus trials  - Compensatory strategy training  - Patient/caregiver education    Discussed POC: Patient  Discussed Risks/Benefits: Yes  Patient/Caregiver Agreeable: Yes    Short term goals established 25:   - Patient will tolerate the current  least restrictive diet diet without noted pulmonary compromise or evidence of pulmonary sequela as noted in patient chart and/or reported by patient.   - Patient will independently implement safe swallowing strategies to reduce risk of penetration / aspiration with use of compensatory strategies across PO/meals.       Education Provided: Results and recommendations per MBSS, with video review; recommendations and POC at this time. Verbal understanding and agreement given on all accounts.     Treatment Provided Today: ST provided extensive education and training to pt regarding anatomy/physiology of swallow function, risk factors of penetration /aspiration/aspiration pna & how to mitigate factors, diet recs , and the use of compensatory swallow strategies to promote pt swallowing efficiency  upon PO intake including .   In addition, ST provided instruction/training on oropharyngeal exercises (re: effortful swallow).     Additional Medical Consults Suggested:   - No new disciplines indicated, pt actively receiving OP ST at [facility]     Repeat Study: n/a        Mechanics of the Swallow Summary:  ORAL PHASE:  Lip Closure - No labial escape/anterior loss of bolus   Tongue Control During Bolus Hold - Cohesive bolus between tongue to palatal seal   Bolus prep/mastication - Timely and efficient mastication  skills   Bolus transport/lingual motion - Brisk tongue motion for A-P movement of the bolus   Oral residue - Trace residue lining oral structures only of the BOT    PHARYNGEAL PHASE:  Initiation of pharyngeal swallow - Bolus head at posterior angle of ramus and at the level of the valleculae across large sequential sips.   Soft palate elevation - No bolus between soft palate/pharyngeal wall   Laryngeal elevation - Complete superior movement of thyroid cartilage with contact of arytenoids to epiglottic petiole   Anterior hyoid excursion - Complete anterior movement   Epiglottic movement - Partial inversion-delayed in timing of the full defection > across sequential sips.   Laryngeal vestibule closure - Incomplete - narrow column of air/contrast in laryngeal vestibule   Pharyngeal stripping wave - Complete  Pharyngeal contraction (A/P view) - Not tested       Pharyngoesophageal segment opening - Partial distension/partial duration with partial obstruction of flow of bolus   Tongue base retraction - Trace column of contrast or air between tongue base and pharyngeal wall   Pharyngeal residue - Trace residue within or on the pharyngeal structures at the level of the vallecula and piriforms .  Of note, C4-C5 osteophytes and prominent tissue viewed at this level most likely contributing the trace residue .     ESOPHAGEAL PHASE:  Esophageal clearance - Complete clearance        SLP Impressions with Severity Rating:   Pt presents with grossly  intact Oral and Min pharyngeal dysphagia upon completion of modified barium swallow study this date. Swallowing physiology is detailed above.   Impairments most impacting swallowing  efficiency include min decreased tongue base retraction , min reduced PES opening and incomplete vestibular closure across large bolus liquid swallows .    Patient demonstrated trace to min penetration during the swallow across large sips of thins in sequential swallows that resulted in trace aspiration on  subsequent swallows d/t the residue remaining in the laryngeal vestibule. Trace transient only penetration viewed during the Mildly thick straw sequential swallows.  This occurred d/t reduced  timely coordination of the full  epiglottic deflection.  Penetration eliminated by small sips .  No further penetration across small sips, Moderately thick , purees, and regular solids  were  observed  and no further  aspiration was visualized during study.     Strategies attempted- Effortful swallow resulted in improved clearance of solids.   Small sips eliminated the penetration .      OUTCOME MEASURES:  Functional Oral Intake Scale  Functional Oral Intake Scale: Level 7        total oral diet with no restrictions       Eating Assessment Tool (EAT-10) Total score 9/40.   0=No problem, 1=Mild problem, 2=Mild to moderate problem, 3=Moderate problem, 4=Severe problem  My swallowing problem has caused me to lost weight = 0  My swallowing problem interferes with my ability to go out for meals =1  Swallowing liquids takes extra effort = 0  Swallowing solids takes extra effort =1  Swallowing pills takes extra effort =2  Swallowing is painful = 0  The pleasure of eating is affected by my swallowing = 1  When I swallow food sticks in my throat =2  I cough when I eat = 2  Swallowing is stressful =   0    Rosenbek's Penetration Aspiration Scale  Thin Liquids: 8. Trace SILENT ASPIRATION - contrast passes glottis on subsequent swallows  during large bolus sequential swallows of thins only, visible residue, NO pt response]   Nectar Thick Liquids: 2. Trace  PENETRATION that CLEARS - contrast enter airway, above vocal cords, no residue  Honey Thick Liquids: 1. NO ASPIRATION & NO PENETRATION - no aspiration, contrast does not enter airway  Puree: 1. NO ASPIRATION & NO PENETRATION - no aspiration, contrast does not enter airway  Soft Solid: not given   Solids: 1. NO ASPIRATION & NO PENETRATION - no aspiration, contrast does not enter airway

## 2025-04-16 DIAGNOSIS — G20.A1 PARKINSON'S DISEASE: ICD-10-CM

## 2025-04-17 ENCOUNTER — TELEPHONE (OUTPATIENT)
Dept: NEUROLOGY | Facility: CLINIC | Age: 70
End: 2025-04-17
Payer: MEDICARE

## 2025-04-17 RX ORDER — CARBIDOPA AND LEVODOPA 25; 100 MG/1; MG/1
TABLET ORAL
Qty: 720 TABLET | Refills: 3 | Status: SHIPPED | OUTPATIENT
Start: 2025-04-17

## 2025-04-17 NOTE — TELEPHONE ENCOUNTER
Ray Beckford Jr. called. He needs a refill of Carbidopa/ Levodopa 25/100  2.5 tabs at 7a, 10a, 2p, 6p and 9p. #375 with no refills.   Crexont should be arriving within the next week or two ( troubles with the Patient assistance program approval)  Send to [facility] on file ( increased noted in the patient messages)

## 2025-05-07 ENCOUNTER — TELEPHONE (OUTPATIENT)
Dept: NEUROLOGY | Facility: CLINIC | Age: 70
End: 2025-05-07
Payer: MEDICARE

## 2025-05-07 NOTE — TELEPHONE ENCOUNTER
Ray Beckford Jr. called. He has his Crexont and would like to review how he should take it in regards to his Carbidopa/ Levodopa 25/100, Pramipexole, Comtan and Carbidopa/ Levodopa ER. Please include specific times. Please call [phone] (home) [phone] (work)     Patient received Crexont today. Went over the instructions again on how to switch from Sinemet. Also sent instructions again to patient through [employer].

## 2025-05-27 ENCOUNTER — OFFICE VISIT (OUTPATIENT)
Dept: URGENT CARE | Age: 70
End: 2025-05-27
Payer: MEDICARE

## 2025-05-27 VITALS
SYSTOLIC BLOOD PRESSURE: 130 MMHG | HEART RATE: 69 BPM | RESPIRATION RATE: 20 BRPM | DIASTOLIC BLOOD PRESSURE: 79 MMHG | OXYGEN SATURATION: 96 % | TEMPERATURE: 97.6 F

## 2025-05-27 DIAGNOSIS — R05.1 ACUTE COUGH: ICD-10-CM

## 2025-05-27 DIAGNOSIS — Z20.822 CLOSE EXPOSURE TO COVID-19 VIRUS: ICD-10-CM

## 2025-05-27 DIAGNOSIS — U07.1 COVID-19: Primary | ICD-10-CM

## 2025-05-27 LAB
POC CORONAVIRUS SARS-COV-2 PCR: POSITIVE
POC HUMAN RHINOVIRUS PCR: NEGATIVE
POC INFLUENZA A VIRUS PCR: NEGATIVE
POC INFLUENZA B VIRUS PCR: NEGATIVE
POC RESPIRATORY SYNCYTIAL VIRUS PCR: NEGATIVE

## 2025-05-27 RX ORDER — BENZONATATE 200 MG/1
200 CAPSULE ORAL 3 TIMES DAILY PRN
Qty: 30 CAPSULE | Refills: 0 | Status: SHIPPED | OUTPATIENT
Start: 2025-05-27 | End: 2025-06-06

## 2025-05-27 ASSESSMENT — ENCOUNTER SYMPTOMS
WHEEZING: 0
SINUS PAIN: 0
BACK PAIN: 1
COUGH: 1
SINUS PRESSURE: 0
FATIGUE: 1
SORE THROAT: 0
SHORTNESS OF BREATH: 0
RHINORRHEA: 0

## 2025-05-27 NOTE — PROGRESS NOTES
Subjective   Patient ID: Ray Beckford Jr. is a 70 y.o. male. They present today with a chief complaint of Cough (X 1 day) and Back Pain.    History of Present Illness  Recent travel on a cruise to [place]. H/o Parkinson's.  C/o cough and cold s/s x 1 day(s). Has tried OTC meds with mild relief. Denies any CP, SOB, HA, fever, abdominal pain, and nausea/vomiting otherwise.       History provided by:  Patient  Cough  Associated symptoms include postnasal drip. Pertinent negatives include no ear pain, rhinorrhea, sore throat, shortness of breath or wheezing.   Back Pain        Past Medical History  Allergies as of 05/27/2025    (No Known Allergies)       Prescriptions Prior to Admission[1]     Medical History[2]    Surgical History[3]     reports that he has quit smoking. His smoking use included cigarettes. He has never used smokeless tobacco. He reports that he does not drink alcohol and does not use drugs.    Review of Systems  Review of Systems   Constitutional:  Positive for fatigue.   HENT:  Positive for postnasal drip. Negative for ear discharge, ear pain, rhinorrhea, sinus pressure, sinus pain and sore throat.    Respiratory:  Positive for cough. Negative for shortness of breath and wheezing.    Musculoskeletal:  Positive for back pain.   All other systems reviewed and are negative.                                 Objective    Vitals:    05/27/25 1926   BP: 130/79   Pulse: 69   Resp: 20   Temp: 36.4 °C (97.6 °F)   TempSrc: Temporal   SpO2: 96%     No LMP for male patient.    Physical Exam  Vitals and nursing note reviewed.   Constitutional:       General: He is not in acute distress.     Appearance: Normal appearance. He is normal weight. He is not ill-appearing, toxic-appearing or diaphoretic.   HENT:      Head: Normocephalic and atraumatic.      Right Ear: Tympanic membrane and ear canal normal.      Left Ear: Tympanic membrane and ear canal normal.      Nose: Nose normal. No rhinorrhea.      Mouth/Throat:       Mouth: Mucous membranes are moist.      Pharynx: Oropharynx is clear. Posterior oropharyngeal erythema present.   Eyes:      Extraocular Movements: Extraocular movements intact.      Pupils: Pupils are equal, round, and reactive to light.   Cardiovascular:      Rate and Rhythm: Normal rate and regular rhythm.      Pulses: Normal pulses.      Heart sounds: Normal heart sounds.   Pulmonary:      Effort: Pulmonary effort is normal. No respiratory distress.      Breath sounds: Normal breath sounds. No wheezing or rhonchi.   Abdominal:      General: Bowel sounds are normal.      Tenderness: There is no right CVA tenderness or left CVA tenderness.   Skin:     General: Skin is warm and dry.   Neurological:      Mental Status: He is alert and oriented to person, place, and time.   Psychiatric:         Mood and Affect: Mood normal.         Behavior: Behavior normal.         Procedures    Point of Care Test & Imaging Results from this visit  Results for orders placed or performed in visit on 05/27/25   POCT SPOTFIRE R/ST Panel Mini w/COVID ([facility]) manually resulted    Specimen: Swab   Result Value Ref Range    POC Sars-Cov-2 PCR Positive (A) Negative    POC Respiratory Syncytial Virus PCR Negative Negative    POC Influenza A Virus PCR Negative Negative    POC Influenza B Virus PCR Negative Negative    POC Human Rhinovirus PCR Negative Negative      Imaging  No results found.    Cardiology, Vascular, and Other Imaging  No other imaging results found for the past 2 days      Diagnostic study results (if any) were reviewed by DOMINIQUE Clarke.    Assessment/Plan   Allergies, medications, history, and pertinent labs/EKGs/Imaging reviewed by DOMINIQUE Clarke.     Medical Decision Making  Positive COVID 19. See results. Presentation consistent with and discussed viral etiology. Did not appreciate any s/s bacterial infection at this time. Sending benzonatate for cough. Discussed pushing fluids, rest, OTC  symptoms management PRN. Close follow up with PCP. ED for any new or worsening s/s. Patient verbalized understanding and agreed with the plan of care.      At time of discharge, patient was clinically well-appearing and appropriate for outpatient management. The patient/parent/guardian was educated regarding diagnosis, supportive care, OTC and Rx medications. The patient/parent/guardian was given the opportunity to ask questions prior to discharge. They verbalized understanding of discussion of treatment plan, expected course of illness and/or injury, indications on when to return to , when to seek further evaluation in ED/call 911, and the need to follow up with PCP and/or specialist as referred. Patient/parent/guardian was provided with work/school documentation if requested. Patient stable upon discharge.      Orders and Diagnoses  Diagnoses and all orders for this visit:  COVID-19  Acute cough  -     benzonatate (Tessalon) 200 mg capsule; Take 1 capsule (200 mg) by mouth 3 times a day as needed for cough for up to 10 days. Do not crush or chew.  Close exposure to COVID-19 virus  -     POCT SPOTFIRE R/ST Panel Mini w/COVID ([facility]) manually resulted      Medical Admin Record      Patient disposition: Home    Electronically signed by DOMINIQUE Clarke  8:08 PM       [1] (Not in a hospital admission)   [2]   Past Medical History:  Diagnosis Date    Personal history of other diseases of the digestive system     History of hiatal hernia    Personal history of other diseases of the digestive system     History of gastroesophageal reflux (GERD)   [3]   Past Surgical History:  Procedure Laterality Date    OTHER SURGICAL HISTORY  10/29/2019    Ankle surgery

## 2025-07-11 ENCOUNTER — TELEPHONE (OUTPATIENT)
Dept: NEUROLOGY | Facility: CLINIC | Age: 70
End: 2025-07-11
Payer: MEDICARE

## 2025-07-11 NOTE — TELEPHONE ENCOUNTER
Called Back- No Answer, Left detailed message to call us back or send MyChart with medication questions.

## 2025-07-11 NOTE — TELEPHONE ENCOUNTER
Ray Beckford Jr. called. He would like to speak to you specifically regarding his Crexont. Please call [phone] (home) [phone] (work)

## 2025-07-16 ENCOUNTER — TELEPHONE (OUTPATIENT)
Dept: NEUROLOGY | Facility: CLINIC | Age: 70
End: 2025-07-16
Payer: MEDICARE

## 2025-07-16 NOTE — TELEPHONE ENCOUNTER
I spoke to the patient he conforms taking Crexont 350 - take 2 capsules - three times a day. Take at 7 am -12 noon and 5 pm; has weaned off pramipexole, STOP Selegiline, STOP entacapone  He continues carbidopa/levodopa 50/200 CR at bedtime and takes sertraline 50mg qhs.     He complains that C/KL ER 50/200 wears off in the middle of the night- when he gets up at 3a to urinate he is fine; but when he gets up at 5a to urinate he has difficulty getting to the bathroom and can barely walk when he wakes at 7a.    He also complains  that when he takes his 7a Crexont his left hand freezes ; his lt forearm goes numb and his lt pinky finger twitches like crazy. But an hour after his 7a crexont he feels great- which lasts for about 2.5 hours until around 1030-11a. Around 11a he gets lighheaded, stiffness in forearms; but can walk okay. Takes noon dose without any problems and is fine in the afternoon.    His 5p Crexont causes similar problems to 7a with lt hand freezing and numbness.    Any advice for his med regimen or new complaints. He is scheduled w you in August; can offer him a visit edvin Neff in person or virtual?

## 2025-07-18 ENCOUNTER — PATIENT MESSAGE (OUTPATIENT)
Dept: NEUROLOGY | Facility: CLINIC | Age: 70
End: 2025-07-18
Payer: MEDICARE

## 2025-07-18 DIAGNOSIS — G20.A1 PARKINSON'S DISEASE, UNSPECIFIED WHETHER DYSKINESIA PRESENT, UNSPECIFIED WHETHER MANIFESTATIONS FLUCTUATE: Primary | ICD-10-CM

## 2025-07-23 ENCOUNTER — TELEPHONE (OUTPATIENT)
Dept: NEUROLOGY | Facility: CLINIC | Age: 70
End: 2025-07-23
Payer: MEDICARE

## 2025-07-23 DIAGNOSIS — G20.A1 PARKINSON'S DISEASE: ICD-10-CM

## 2025-07-23 RX ORDER — CARBIDOPA AND LEVODOPA 25; 100 MG/1; MG/1
1 TABLET ORAL DAILY
Qty: 90 TABLET | Refills: 3 | Status: SHIPPED | OUTPATIENT
Start: 2025-07-23 | End: 2026-07-23

## 2025-07-23 NOTE — TELEPHONE ENCOUNTER
Ray Beckford Jr. called. He would like to request a new script for Carbidopa/ Levodopa 25/100 1 at 3am . Please cancel all other Carbidopa/ Levodopa 25/100 scripts on file. He stated he completely out.

## 2025-07-29 RX ORDER — CARBIDOPA AND LEVODOPA 70; 280 MG/1; MG/1
1 CAPSULE, EXTENDED RELEASE ORAL 4 TIMES DAILY
Qty: 120 EACH | Refills: 6 | Status: SHIPPED | OUTPATIENT
Start: 2025-07-29

## 2025-07-29 RX ORDER — CARBIDOPA AND LEVODOPA 70; 280 MG/1; MG/1
1 CAPSULE, EXTENDED RELEASE ORAL 4 TIMES DAILY
Qty: 120 EACH | Refills: 6 | Status: SHIPPED | OUTPATIENT
Start: 2025-07-29 | End: 2025-07-29 | Stop reason: DRUGHIGH

## 2025-07-30 ENCOUNTER — TELEPHONE (OUTPATIENT)
Dept: NEUROLOGY | Facility: CLINIC | Age: 70
End: 2025-07-30
Payer: MEDICARE

## 2025-07-30 NOTE — TELEPHONE ENCOUNTER
Patient calls multiple times over the past couple of days with difficulty getting RX dose increase from pharmacy.     T confirms [facility] ([facility]) - [place], [place] - [address], [facility] [address] Phone: [phone]    Epic confirms that RX was sent 7.29.2025 at 3.59p. Pharmacy denies reciept and sent fax 4.29.2025 16:41p that they cannot fill.     I spoke to pharmacists Robert marin at [facility] ([facility]) - [place], [place]  [phone]    I placed a verbal order for both Crexont  isaura 1 tab 4x/day and Crexont 87.5-350 take 1 tab 4x/day; 30d x6R    Pharmacy says they recently shipped previous RX 87.5-350 2 capsules by mouth 3 times a day on 7.28.2025

## 2025-07-31 ENCOUNTER — OFFICE VISIT (OUTPATIENT)
Dept: NEUROLOGY | Facility: HOSPITAL | Age: 70
End: 2025-07-31
Payer: MEDICARE

## 2025-07-31 DIAGNOSIS — G20.B2 PARKINSON'S DISEASE WITH DYSKINESIA AND FLUCTUATING MANIFESTATIONS: ICD-10-CM

## 2025-07-31 DIAGNOSIS — F41.9 ANXIETY: ICD-10-CM

## 2025-07-31 DIAGNOSIS — G20.A1 PARKINSON'S DISEASE, UNSPECIFIED WHETHER DYSKINESIA PRESENT, UNSPECIFIED WHETHER MANIFESTATIONS FLUCTUATE: Primary | ICD-10-CM

## 2025-07-31 PROCEDURE — 99215 OFFICE O/P EST HI 40 MIN: CPT | Performed by: NURSE PRACTITIONER

## 2025-07-31 PROCEDURE — 1160F RVW MEDS BY RX/DR IN RCRD: CPT | Performed by: NURSE PRACTITIONER

## 2025-07-31 PROCEDURE — 1159F MED LIST DOCD IN RCRD: CPT | Performed by: NURSE PRACTITIONER

## 2025-07-31 ASSESSMENT — UNIFIED PARKINSONS DISEASE RATING SCALE (UPDRS)
MINUTES_SINCE_LEVODOPA: 2HRS
CHAIR_RISING_SCALE: 0
RIGIDITY_RUE: 2
TOTAL_SCORE: 25
LEG_AGILITY_RIGHT: 0
HANDMOVEMENTS_RIGHT: 0
POSTURAL_STABILITY: 0
RIGIDITY_LUE: 2
RIGIDITY_LLE: 2
AMPLITUDE_LIP_JAW: 0
MOVEMENTS_INTERFERE_WITH_RATINGS: NO
FINGER_TAPPING_LEFT: 0
PARKINSONS_MEDS: YES
SPONTANEITY_OF_MOVEMENT: 2
AMPLITUDE_LLE: 0
TOETAPPING_LEFT: 0
TOETAPPING_RIGHT: 0
CLINICAL_STATE: ON
DYSKINESIAS_PRESENT: YES
LEG_AGILITY_LEFT: 1
AMPLITUDE_RUE: 0
RIGIDITY_NECK: 3
RIGIDITY_RLE: 2
AMPLITUDE_LUE: 0
AMPLITUDE_RLE: 0
POSTURE: 1
FINGER_TAPPING_RIGHT: 0
CONSTANCY_TREMOR_ATREST: 0
PRONATION_SUPINATION_RIGHT: 0
GAIT: 1
PRONATION_SUPINATION_LEFT: 1
FREEZING_GAIT: 0
FACIAL_EXPRESSION: 2
POSTURAL_TREMOR_RIGHTHAND: 2
KINETIC_TREMOR_RIGHTHAND: 0
SPEECH: 2
LEVODOPA: YES
POSTURAL_TREMOR_LEFTHAND: 0
KINETIC_TREMOR_LEFTHAND: 1

## 2025-07-31 NOTE — PATIENT INSTRUCTIONS
#Increase the Crexont as planned to 350mg cap AND 280mg one of each 4 times a day    #Consult Dr. Pedroza for anxiety     Please call to make an apt with psychologist Dr. Pedroza for mood. Please call Dyana Avina to schedule at [phone].     #Continue Sertraline 50mg daily    #Hopefully can get back into exercise once able    #Save the date for [facility] [place] Parkinson's Boot Camp 2025!  November 1, 2025 at the [facility]: [place], [facility]  Online registration information to follow      Tawanda Sequeira NP-C  Adult/Gerontological Nurse Practitioner   [facility], Department of Neurology  Neurological [place]  [facility]  [address] [place] [address]  Phone: [phone]  Fax: [phone]

## 2025-07-31 NOTE — PROGRESS NOTES
"Subjective     Ray Beckford Jr. is a 70 y.o. year old male who presents with No chief complaint on file., here for follow up visit.    HPI  Last visit 3/4/25 with Dr. Garg:  Start Crexont 350 - take 2 capsules - three times a day. Take at 7 am -12 noon and 5 pm   Take pramipexole 0.5 mg tab - take 1/2 tab three times a day for one week, after 1 week - take 1/2 tab at bedtime only  - then STOP  STOP Selegiline  STOP entacapone  Continue carbidopa/levodopa 50/200 CR at bedtime.   Sertraline for anxiety - start in 1 week - 50 mg at bedtime.   For the time being till you get the crexont - take carbidopa/levodopa 25/100 - 2 tabs at 7 am - 2 tab at 10 am - 2 tabs at 2 pm - 2 tabs at 6 pm, 2 tab at 9 pm  Other option in the future include Vyalev - levodopa subcut infusions or deep brain stimulation.     He comes with wife.     Crexont is a \"jolt\" and feels his arms and toes go numb then normal after 30mins to 1hr and wears off at 1hr.  He is having more off time.   Ongoing issue with L side being off and numb and this is not new--L hand is frozen and numb and goes up his forearm and was not as bad on Sinemet as on Crexont   2 hrs after Crexont he gets a \"jolt\"/overload and feels like too much for him.   Paresthesia wears off when he is on    Per wife he \"lives and breathes thinking about his medication all day long\", constantly going on about the medication and how he is feeling for the past 5 years every day per wife.  He says this is constantly on his mind waiting for down time that will be horrible.     Labs 7/29/25 unremarkable except DM  CBC  CMP   B12- 455  Folate 14  TSH wnl  A1C 7.2      MOTOR SYMPTOMS      +/-                            Comments  Motor sx overall  Worse wearing off with Crexont    Off time at night.  May switch beds in the night to be able to sleep on one side or the other--silk PJs to roll over easily   Tremor + L pinky only   Rigidity + Worse with wearing off--worse on the LUE and LLE " "  Bradykinesia +    Gait difficulty + Worse   Needing help out of the chair to the walker  Shuffling  Balance issues some   FOG + Every am x 1 hr severe  W/ wearing off    Falls -    PT -    Exercise + Rock steady boxing 2x/week--has not been going for the past few weeks on Crexont    Tennis ball when walking exercises  Strethces     NON MOTOR SYMPTOMS       +/-                               Comments  Orthostatic lightheadedness  -    Cognitive + Maybe a little-wife denies concerns     Insomnia + Sleep is better lately    Chronic x 10 years, sleeps 2hrs and then wakes  on and off.        RBD + \"Every once in a while per wife\"-not even once a month the past month and has never been angry   Very frustrated he cannot be as handy and living with these issues  Highs/lows and lows are depressing \"why should I even live?\" He asks his wife     Depression + \"Short fuse\"--agitated when off or in pain   Anxiety + Better on Zoloft, he does not feel it was that bad but wife said it was   \"I am a catastrophic\" and constantly worrying about everything but mostly meds- \"obsessions per meds\"    Does see a  every few weeks for counseling    Fatigue   + 3 naps a day he feels d/t being off he rather sleep   Sialorrhea + Has not occurred for a few weeks and only 10mins when does   Hypophonia +    Dysphagia + Some  Completed ST recently which helped--not doing exercise     HX:  MBSS 4/2/25  DIET RECOMMENDATIONS:  - Regular (IDDSI Level 7)  - Thin liquids (IDDSI Level 0) Small sips  Per the results of today's MBSS, patient to continue baseline diet of  Regular  consistencies and thin  liquids following swallow strategies  listed below:       Constipation -       Social  Lives with: wife (who still works)  Help with ADLs: independent          [facility] Meds  Med Dose Time   Crexont 350mg 2 caps 3 times a day Every 4.5-5 hrs  [id number]   Carbidopa/levodopa CR 50/200mg  1 tab at bedtime 11pm "   Carbidopa-levodopa 25/100mg 1 tab in the middle of the night if up to urinate    Sertraline 50mg 1 tab daily    Latency 30-60mins    Wearing off More off time d/t latency and wearing off 1hr prior to next dose  Off time in the am is difficult    Side effects     Dyskinesia Used to sway some with Sinemet but does not as much now  Wife notices when excited talking about things and does not realize     Hallucinations None (said he has a floater that was issue before)    Impulse control None  [employer] and is getting [employer]in TVs he is fixing often buying but not sure it is an issue    Sudden sleep None    Other None        Prior meds:  Sinemet 1.5tabs 4 times a day--sometimes 5x/day bc taking closer together  Selegeline- stopped when started Crexont  Entacapone-stopped when started Crexont  Pramipexole- stopped when started Crexont  No Inbrija--dx with COPD, used to be on inhalers but no longer        Current Outpatient Medications:     carbidopa-levodopa (Crexont)  mg capsule,IR -extend rel,biphase, Take 1 capsule by mouth 4 times a day. Take with Crexont 350mg 1 cap 4 times a day., Disp: 120 each, Rfl: 6    carbidopa-levodopa (Sinemet CR)  mg ER tablet, Take 1 tablet by mouth once daily at bedtime., Disp: 90 tablet, Rfl: 3    carbidopa-levodopa (Sinemet)  mg tablet, Take 1 tablet by mouth once daily. At 3 am, Disp: 90 tablet, Rfl: 3    carbidopa-levodopa 87.5-350 mg capsule,IR -extend rel,biphase, Take 1 capsule by mouth 4 times a day. Take with 280mg cap 4 times a day., Disp: , Rfl:     cyanocobalamin (Vitamin B-12) 1,000 mcg tablet, B-12 TABS  Refills: 0     Active, Disp: , Rfl:     fish oil concentrate (Omega-3) 120-180 mg capsule, Fish Oil 1000 MG Oral Capsule  Refills: 0      Start : 29-Oct-2019  Active, Disp: , Rfl:     hydrocortisone 2.5 % lotion, PLEASE SEE ATTACHED FOR DETAILED DIRECTIONS, Disp: , Rfl:     LORazepam (Ativan) 0.5 mg tablet, Take 1 tablet (0.5 mg) by mouth every 8 hours if  needed for anxiety for up to 10 doses., Disp: 10 tablet, Rfl: 0    nystatin (Mycostatin) 100,000 unit/mL suspension, TAKE 5 ML BY MOUTH FOUR TIMES DAILY FOR 7 DAYS. SWISH FOR 5 MINUTES AND SWALLOW., Disp: , Rfl:     pramipexole (Mirapex) 0.5 mg tablet, TAKE 1 TABLET BY MOUTH THREE TIMES A DAY, Disp: 270 tablet, Rfl: 1    sertraline (Zoloft) 50 mg tablet, Take 1 tablet (50 mg) by mouth once daily., Disp: 30 tablet, Rfl: 11    vitamin E acid succinate (vitamin E succinate) 134 mg (200 unit) tablet, Vitamin E 200 UNIT Oral Tablet  Refills: 0      Start : 29-Oct-2019  Active, Disp: , Rfl:        Objective   Visit Vitals  Smoking Status Former      Physical Exam    Motor Exam:  Shows symmetric strength in the upper and lower extremities bilaterally both proximally and distally with normal tone and bulk. Muscle strength 5/5 throughout.   Deep Tendon Reflexes:  Are intact throughout BUE and BLE 2+    MDS UPDRS 1st Score: Motor Examination  Is the patient on medication for treating the symptoms of Parkinson's Disease?: Yes  Patients receiving medication for treating the symptoms of Parkinson's Disease, aliyah the patient's clinical state.: On  Is the patient on Levodopa?: Yes  Minutes since last Levodopa dose: 2hrs  Speech: 2  Facial Expression: 2  Rigidty Neck: 3  Rigidty RUE: 2  Rigidity - LUE: 2  Rigidity RLE: 2  Rigidity LLE: 2  Finger Tapping Right Hand: 0  Finger Tapping Left Hand: 0  Hand Movements- Right Hand: 0  Hand Movements- Left Hand: 1  Pronatiaon-Supination Movments - Right Hand: 0  Pronatiaon-Supination Movments Left Hand: 1  Toe Tapping Right Foot: 0  Toe Tapping - Left Foot: 0  Leg Agility - Right Le  Leg Agility - Left le  Arising from Chair: 0  Gait: 1  Freezing of Gait: 0  Postural Stability: 0  Posture: 1  Global Spontanteity of Movment ( Body Bradykinesia): 2  Postural Tremor - Right Hand: 2  Postural Tremor - Left hand: 0  Kinetic Tremor - Right hand: 0  Kinetic Tremor - Left hand: 1  Rest  Tremor Amplitude - RUE: 0  Rest Tremor Amplitude - LUE: 0  Rest Tremor Amplitude - RLE: 0  Rest Tremor Amplitude - LLE: 0  Rest Tremor Amplitude - Lip/Jaw: 0  Constancy of Rest Tremor: 0  MDS UPDRS Total Score: 25  Were dyskinesias (chorea or dystonia) present during examination?: Yes (mild BLE movements intermittently and worse w/ concentration)  Did these movements interfere with your rating?: No      Assessment/Plan   Mr. Ray Beckford Jr. is a 70 y.o. M  with Parkinson's disease (idiopathic) here for follow up.    PD: Worse. C/b motor fluctuations and dyskinesia. Crexont started last visit, still w/ wearing off, will adjust dose.   Change Crexont as planned to QID (one 350mg + one 280mg QID)  Discussed Vyalev and DBS as next options and we discussed these today  Continue exercise regimen once wearing off has improved  Mentions LUE paresthesias/numb only when off meds and no weakness or hyperreflexia on exam  Anxiety: marked and having an impact on daily life. Zoloft added last visit and helped some but he declines to increase (discussed he is at low dose)  C/s psychologist Dr. Pedroza      Diagnoses and all orders for this visit:  Parkinson's disease, unspecified whether dyskinesia present, unspecified whether manifestations fluctuate  -     Referral to Psychology; Future  Anxiety  -     Referral to Psychology; Future        #Increase the Crexont as planned to 350mg cap AND 280mg one of each 4 times a day    #Consult Dr. Pedroza for anxiety     Please call to make an apt with psychologist Dr. Pedroza for mood. Please call Dyana Avina to schedule at [phone].     #Continue Sertraline 50mg daily    #Hopefully can get back into exercise once able    #Save the date for [facility] Parkinson's Boot Camp 2025!  November 1, 2025 at the [facility]: [address], [facility]  Online registration information to follow      DAVID WILLAMS, personally performed  a medically appropriate exam, discussion of  care/treatment options taking a total time of 52 minutes for today's visit.    Tawanda Sequeira NP-C  Adult/Gerontological Nurse Practitioner   [facility], Department of Neurology  Neurological [facility]  [address] [id number]  Phone: [phone]  Fax: [phone]

## 2025-08-25 ENCOUNTER — OFFICE VISIT (OUTPATIENT)
Dept: NEUROLOGY | Facility: CLINIC | Age: 70
End: 2025-08-25
Payer: MEDICARE

## 2025-08-25 ENCOUNTER — TELEPHONE (OUTPATIENT)
Dept: NEUROLOGY | Facility: CLINIC | Age: 70
End: 2025-08-25
Payer: MEDICARE

## 2025-08-25 VITALS
WEIGHT: 194 LBS | BODY MASS INDEX: 27.16 KG/M2 | DIASTOLIC BLOOD PRESSURE: 83 MMHG | HEART RATE: 63 BPM | SYSTOLIC BLOOD PRESSURE: 135 MMHG | HEIGHT: 71 IN

## 2025-08-25 DIAGNOSIS — F32.A ANXIETY AND DEPRESSION: ICD-10-CM

## 2025-08-25 DIAGNOSIS — F41.9 ANXIETY AND DEPRESSION: ICD-10-CM

## 2025-08-25 DIAGNOSIS — G20.A1 PARKINSON'S DISEASE, UNSPECIFIED WHETHER DYSKINESIA PRESENT, UNSPECIFIED WHETHER MANIFESTATIONS FLUCTUATE: Primary | ICD-10-CM

## 2025-08-25 DIAGNOSIS — G20.B2 PARKINSON'S DISEASE WITH DYSKINESIA AND FLUCTUATING MANIFESTATIONS: ICD-10-CM

## 2025-08-25 DIAGNOSIS — R13.10 DYSPHAGIA, UNSPECIFIED TYPE: ICD-10-CM

## 2025-08-25 PROCEDURE — 1159F MED LIST DOCD IN RCRD: CPT | Performed by: PSYCHIATRY & NEUROLOGY

## 2025-08-25 PROCEDURE — G2211 COMPLEX E/M VISIT ADD ON: HCPCS | Performed by: PSYCHIATRY & NEUROLOGY

## 2025-08-25 PROCEDURE — 3008F BODY MASS INDEX DOCD: CPT | Performed by: PSYCHIATRY & NEUROLOGY

## 2025-08-25 PROCEDURE — 1160F RVW MEDS BY RX/DR IN RCRD: CPT | Performed by: PSYCHIATRY & NEUROLOGY

## 2025-08-25 PROCEDURE — 99214 OFFICE O/P EST MOD 30 MIN: CPT | Performed by: PSYCHIATRY & NEUROLOGY

## 2025-08-25 PROCEDURE — 99212 OFFICE O/P EST SF 10 MIN: CPT

## 2025-08-25 RX ORDER — CARBIDOPA AND LEVODOPA 70; 280 MG/1; MG/1
1 CAPSULE, EXTENDED RELEASE ORAL 4 TIMES DAILY
Qty: 120 EACH | Refills: 6 | Status: SHIPPED | OUTPATIENT
Start: 2025-08-25

## 2025-08-25 ASSESSMENT — ANXIETY QUESTIONNAIRES
7. FEELING AFRAID AS IF SOMETHING AWFUL MIGHT HAPPEN: NOT AT ALL
3. WORRYING TOO MUCH ABOUT DIFFERENT THINGS: NOT AT ALL
GAD7 TOTAL SCORE: 0
2. NOT BEING ABLE TO STOP OR CONTROL WORRYING: NOT AT ALL
6. BECOMING EASILY ANNOYED OR IRRITABLE: NOT AT ALL
1. FEELING NERVOUS, ANXIOUS, OR ON EDGE: NOT AT ALL
4. TROUBLE RELAXING: NOT AT ALL
5. BEING SO RESTLESS THAT IT IS HARD TO SIT STILL: NOT AT ALL
IF YOU CHECKED OFF ANY PROBLEMS ON THIS QUESTIONNAIRE, HOW DIFFICULT HAVE THESE PROBLEMS MADE IT FOR YOU TO DO YOUR WORK, TAKE CARE OF THINGS AT HOME, OR GET ALONG WITH OTHER PEOPLE: NOT DIFFICULT AT ALL

## 2025-08-25 ASSESSMENT — UNIFIED PARKINSONS DISEASE RATING SCALE (UPDRS)
POSTURAL_TREMOR_RIGHTHAND: 0
KINETIC_TREMOR_RIGHTHAND: 0
CLINICAL_STATE: ON
LEG_AGILITY_RIGHT: 0
AMPLITUDE_RLE: 0
POSTURAL_TREMOR_LEFTHAND: 0
HANDMOVEMENTS_RIGHT: 2
FREEZING_GAIT: 0
TOETAPPING_LEFT: 1
PARKINSONS_MEDS: YES
KINETIC_TREMOR_LEFTHAND: 0
TOETAPPING_RIGHT: 1
RIGIDITY_RLE: 2
LEG_AGILITY_LEFT: 1
RIGIDITY_NECK: 3
FACIAL_EXPRESSION: 2
RIGIDITY_LUE: 2
LEVODOPA: YES
SPONTANEITY_OF_MOVEMENT: 2
SPEECH: 2
RIGIDITY_RUE: 2
AMPLITUDE_LIP_JAW: 0
AMPLITUDE_LUE: 0
PRONATION_SUPINATION_LEFT: 2
POSTURE: 1
CHAIR_RISING_SCALE: 0
AMPLITUDE_RUE: 0
TOTAL_SCORE: 32
DYSKINESIAS_PRESENT: NO
GAIT: 1
AMPLITUDE_LLE: 0
RIGIDITY_LLE: 1
CONSTANCY_TREMOR_ATREST: 0
POSTURAL_STABILITY: 1
FINGER_TAPPING_RIGHT: 2
FINGER_TAPPING_LEFT: 1
PRONATION_SUPINATION_RIGHT: 1

## 2025-08-25 ASSESSMENT — ENCOUNTER SYMPTOMS
LOSS OF SENSATION IN FEET: 0
OCCASIONAL FEELINGS OF UNSTEADINESS: 0
DEPRESSION: 0

## 2025-11-19 ENCOUNTER — APPOINTMENT (OUTPATIENT)
Dept: BEHAVIORAL HEALTH | Facility: CLINIC | Age: 70
End: 2025-11-19
Payer: MEDICARE